# Patient Record
Sex: FEMALE | Race: WHITE | NOT HISPANIC OR LATINO | Employment: PART TIME | URBAN - METROPOLITAN AREA
[De-identification: names, ages, dates, MRNs, and addresses within clinical notes are randomized per-mention and may not be internally consistent; named-entity substitution may affect disease eponyms.]

---

## 2017-05-25 ENCOUNTER — HOSPITAL ENCOUNTER (OUTPATIENT)
Dept: MAMMOGRAPHY | Facility: HOSPITAL | Age: 57
Discharge: HOME | End: 2017-05-25
Attending: NURSE PRACTITIONER

## 2017-05-25 DIAGNOSIS — Z12.31 VISIT FOR SCREENING MAMMOGRAM: ICD-10-CM

## 2017-06-05 ENCOUNTER — OFFICE VISIT - HEALTHEAST (OUTPATIENT)
Dept: FAMILY MEDICINE | Facility: CLINIC | Age: 57
End: 2017-06-05

## 2017-06-05 DIAGNOSIS — Z13.29 SCREENING FOR THYROID DISORDER: ICD-10-CM

## 2017-06-05 DIAGNOSIS — Z00.00 ROUTINE GENERAL MEDICAL EXAMINATION AT A HEALTH CARE FACILITY: ICD-10-CM

## 2017-06-05 DIAGNOSIS — Z13.21 ENCOUNTER FOR VITAMIN DEFICIENCY SCREENING: ICD-10-CM

## 2017-06-05 DIAGNOSIS — Z13.0 SCREENING FOR DEFICIENCY ANEMIA: ICD-10-CM

## 2017-06-05 DIAGNOSIS — Z13.1 SCREENING FOR DIABETES MELLITUS: ICD-10-CM

## 2017-06-05 DIAGNOSIS — Z13.220 SCREENING FOR CHOLESTEROL LEVEL: ICD-10-CM

## 2017-06-05 LAB
CHOLEST SERPL-MCNC: 281 MG/DL
FASTING STATUS PATIENT QL REPORTED: YES
HDLC SERPL-MCNC: 60 MG/DL
LDLC SERPL CALC-MCNC: 190 MG/DL
TRIGL SERPL-MCNC: 154 MG/DL

## 2017-06-05 ASSESSMENT — MIFFLIN-ST. JEOR: SCORE: 1107.62

## 2017-06-06 ENCOUNTER — COMMUNICATION - HEALTHEAST (OUTPATIENT)
Dept: FAMILY MEDICINE | Facility: CLINIC | Age: 57
End: 2017-06-06

## 2017-06-06 DIAGNOSIS — E78.5 HYPERLIPIDEMIA: ICD-10-CM

## 2017-07-17 ENCOUNTER — OFFICE VISIT - HEALTHEAST (OUTPATIENT)
Dept: FAMILY MEDICINE | Facility: CLINIC | Age: 57
End: 2017-07-17

## 2017-07-17 DIAGNOSIS — M75.81 ROTATOR CUFF TENDINITIS, RIGHT: ICD-10-CM

## 2017-08-03 ENCOUNTER — OFFICE VISIT - HEALTHEAST (OUTPATIENT)
Dept: PHYSICAL THERAPY | Facility: REHABILITATION | Age: 57
End: 2017-08-03

## 2017-08-03 DIAGNOSIS — M25.611 DECREASED ROM OF RIGHT SHOULDER: ICD-10-CM

## 2017-08-03 DIAGNOSIS — M25.511 ACUTE SHOULDER PAIN DUE TO TRAUMA, RIGHT: ICD-10-CM

## 2017-08-03 DIAGNOSIS — G89.11 ACUTE SHOULDER PAIN DUE TO TRAUMA, RIGHT: ICD-10-CM

## 2017-08-03 DIAGNOSIS — M62.81 MUSCLE WEAKNESS (GENERALIZED): ICD-10-CM

## 2017-08-09 ENCOUNTER — OFFICE VISIT - HEALTHEAST (OUTPATIENT)
Dept: PHYSICAL THERAPY | Facility: REHABILITATION | Age: 57
End: 2017-08-09

## 2017-08-09 DIAGNOSIS — M25.511 ACUTE SHOULDER PAIN DUE TO TRAUMA, RIGHT: ICD-10-CM

## 2017-08-09 DIAGNOSIS — M25.611 DECREASED ROM OF RIGHT SHOULDER: ICD-10-CM

## 2017-08-09 DIAGNOSIS — M62.81 MUSCLE WEAKNESS (GENERALIZED): ICD-10-CM

## 2017-08-09 DIAGNOSIS — G89.11 ACUTE SHOULDER PAIN DUE TO TRAUMA, RIGHT: ICD-10-CM

## 2017-08-18 ENCOUNTER — OFFICE VISIT - HEALTHEAST (OUTPATIENT)
Dept: PHYSICAL THERAPY | Facility: REHABILITATION | Age: 57
End: 2017-08-18

## 2017-08-18 DIAGNOSIS — G89.11 ACUTE SHOULDER PAIN DUE TO TRAUMA, RIGHT: ICD-10-CM

## 2017-08-18 DIAGNOSIS — M62.81 MUSCLE WEAKNESS (GENERALIZED): ICD-10-CM

## 2017-08-18 DIAGNOSIS — M25.511 ACUTE SHOULDER PAIN DUE TO TRAUMA, RIGHT: ICD-10-CM

## 2017-08-18 DIAGNOSIS — M25.611 DECREASED ROM OF RIGHT SHOULDER: ICD-10-CM

## 2017-08-24 ENCOUNTER — OFFICE VISIT - HEALTHEAST (OUTPATIENT)
Dept: PHYSICAL THERAPY | Facility: REHABILITATION | Age: 57
End: 2017-08-24

## 2017-08-24 DIAGNOSIS — M25.511 ACUTE SHOULDER PAIN DUE TO TRAUMA, RIGHT: ICD-10-CM

## 2017-08-24 DIAGNOSIS — M25.611 DECREASED ROM OF RIGHT SHOULDER: ICD-10-CM

## 2017-08-24 DIAGNOSIS — G89.11 ACUTE SHOULDER PAIN DUE TO TRAUMA, RIGHT: ICD-10-CM

## 2017-08-24 DIAGNOSIS — M62.81 MUSCLE WEAKNESS (GENERALIZED): ICD-10-CM

## 2017-08-28 ENCOUNTER — HOSPITAL ENCOUNTER (OUTPATIENT)
Dept: NUTRITION | Facility: HOSPITAL | Age: 57
Discharge: HOME | End: 2017-08-28
Attending: NURSE PRACTITIONER

## 2017-08-28 DIAGNOSIS — E78.5 HYPERLIPIDEMIA, UNSPECIFIED HYPERLIPIDEMIA TYPE: ICD-10-CM

## 2017-09-08 ENCOUNTER — OFFICE VISIT - HEALTHEAST (OUTPATIENT)
Dept: PHYSICAL THERAPY | Facility: REHABILITATION | Age: 57
End: 2017-09-08

## 2017-09-08 DIAGNOSIS — M62.81 MUSCLE WEAKNESS (GENERALIZED): ICD-10-CM

## 2017-09-08 DIAGNOSIS — M25.511 ACUTE SHOULDER PAIN DUE TO TRAUMA, RIGHT: ICD-10-CM

## 2017-09-08 DIAGNOSIS — G89.11 ACUTE SHOULDER PAIN DUE TO TRAUMA, RIGHT: ICD-10-CM

## 2017-09-08 DIAGNOSIS — M25.611 DECREASED ROM OF RIGHT SHOULDER: ICD-10-CM

## 2018-01-30 ENCOUNTER — COMMUNICATION - HEALTHEAST (OUTPATIENT)
Dept: FAMILY MEDICINE | Facility: CLINIC | Age: 58
End: 2018-01-30

## 2018-03-05 ENCOUNTER — COMMUNICATION - HEALTHEAST (OUTPATIENT)
Dept: FAMILY MEDICINE | Facility: CLINIC | Age: 58
End: 2018-03-05

## 2018-06-27 ENCOUNTER — OFFICE VISIT - HEALTHEAST (OUTPATIENT)
Dept: FAMILY MEDICINE | Facility: CLINIC | Age: 58
End: 2018-06-27

## 2018-06-27 DIAGNOSIS — Z00.00 ROUTINE GENERAL MEDICAL EXAMINATION AT A HEALTH CARE FACILITY: ICD-10-CM

## 2018-06-27 DIAGNOSIS — Z12.31 ENCOUNTER FOR SCREENING MAMMOGRAM FOR BREAST CANCER: ICD-10-CM

## 2018-06-27 DIAGNOSIS — N95.1 MENOPAUSAL SYNDROME (HOT FLASHES): ICD-10-CM

## 2018-06-27 DIAGNOSIS — Z13.220 SCREENING FOR CHOLESTEROL LEVEL: ICD-10-CM

## 2018-06-27 DIAGNOSIS — Z13.1 SCREENING FOR DIABETES MELLITUS: ICD-10-CM

## 2018-06-27 DIAGNOSIS — Z13.21 ENCOUNTER FOR VITAMIN DEFICIENCY SCREENING: ICD-10-CM

## 2018-06-27 LAB
CHOLEST SERPL-MCNC: 246 MG/DL
FASTING STATUS PATIENT QL REPORTED: YES
FASTING STATUS PATIENT QL REPORTED: YES
FSH SERPL-ACNC: 72.2 MIU/ML
GLUCOSE BLD-MCNC: 85 MG/DL (ref 70–125)
HDLC SERPL-MCNC: 69 MG/DL
LDLC SERPL CALC-MCNC: 161 MG/DL
TRIGL SERPL-MCNC: 80 MG/DL

## 2018-06-27 ASSESSMENT — MIFFLIN-ST. JEOR: SCORE: 1076.43

## 2018-06-28 LAB
25(OH)D3 SERPL-MCNC: 34 NG/ML (ref 30–80)
25(OH)D3 SERPL-MCNC: 34 NG/ML (ref 30–80)

## 2018-07-26 ENCOUNTER — HOSPITAL ENCOUNTER (OUTPATIENT)
Dept: MAMMOGRAPHY | Facility: CLINIC | Age: 58
Discharge: HOME | End: 2018-07-26
Attending: NURSE PRACTITIONER

## 2018-07-26 DIAGNOSIS — Z12.31 ENCOUNTER FOR SCREENING MAMMOGRAM FOR BREAST CANCER: ICD-10-CM

## 2018-08-15 ENCOUNTER — RECORDS - HEALTHEAST (OUTPATIENT)
Dept: ADMINISTRATIVE | Facility: OTHER | Age: 58
End: 2018-08-15

## 2018-08-31 ENCOUNTER — COMMUNICATION - HEALTHEAST (OUTPATIENT)
Dept: SCHEDULING | Facility: CLINIC | Age: 58
End: 2018-08-31

## 2018-09-11 ENCOUNTER — AMBULATORY - HEALTHEAST (OUTPATIENT)
Dept: NURSING | Facility: CLINIC | Age: 58
End: 2018-09-11

## 2018-09-11 DIAGNOSIS — Z00.00 ROUTINE GENERAL MEDICAL EXAMINATION AT A HEALTH CARE FACILITY: ICD-10-CM

## 2018-11-14 ENCOUNTER — OFFICE VISIT - HEALTHEAST (OUTPATIENT)
Dept: FAMILY MEDICINE | Facility: CLINIC | Age: 58
End: 2018-11-14

## 2018-11-14 DIAGNOSIS — R42 LIGHTHEADEDNESS: ICD-10-CM

## 2018-11-14 LAB
ALBUMIN SERPL-MCNC: 3.7 G/DL (ref 3.5–5)
ALP SERPL-CCNC: 57 U/L (ref 45–120)
ALT SERPL W P-5'-P-CCNC: 15 U/L (ref 0–45)
ANION GAP SERPL CALCULATED.3IONS-SCNC: 9 MMOL/L (ref 5–18)
AST SERPL W P-5'-P-CCNC: 21 U/L (ref 0–40)
BILIRUB SERPL-MCNC: 0.5 MG/DL (ref 0–1)
BUN SERPL-MCNC: 14 MG/DL (ref 8–22)
CALCIUM SERPL-MCNC: 9.1 MG/DL (ref 8.5–10.5)
CHLORIDE BLD-SCNC: 105 MMOL/L (ref 98–107)
CO2 SERPL-SCNC: 28 MMOL/L (ref 22–31)
CREAT SERPL-MCNC: 0.68 MG/DL (ref 0.6–1.1)
ERYTHROCYTE [DISTWIDTH] IN BLOOD BY AUTOMATED COUNT: 11.8 % (ref 11–14.5)
FERRITIN SERPL-MCNC: 66 NG/ML (ref 10–130)
GFR SERPL CREATININE-BSD FRML MDRD: >60 ML/MIN/1.73M2
GLUCOSE BLD-MCNC: 84 MG/DL (ref 70–125)
HCT VFR BLD AUTO: 40.2 % (ref 35–47)
HGB BLD-MCNC: 13.3 G/DL (ref 12–16)
MCH RBC QN AUTO: 29.9 PG (ref 27–34)
MCHC RBC AUTO-ENTMCNC: 33 G/DL (ref 32–36)
MCV RBC AUTO: 91 FL (ref 80–100)
PLATELET # BLD AUTO: 232 THOU/UL (ref 140–440)
PMV BLD AUTO: 7.4 FL (ref 7–10)
POTASSIUM BLD-SCNC: 4.3 MMOL/L (ref 3.5–5)
PROT SERPL-MCNC: 6.6 G/DL (ref 6–8)
RBC # BLD AUTO: 4.43 MILL/UL (ref 3.8–5.4)
SODIUM SERPL-SCNC: 142 MMOL/L (ref 136–145)
TSH SERPL DL<=0.005 MIU/L-ACNC: 2.8 UIU/ML (ref 0.3–5)
VIT B12 SERPL-MCNC: 1008 PG/ML (ref 213–816)
WBC: 4.7 THOU/UL (ref 4–11)

## 2018-12-04 ENCOUNTER — AMBULATORY - HEALTHEAST (OUTPATIENT)
Dept: NURSING | Facility: CLINIC | Age: 58
End: 2018-12-04

## 2018-12-04 DIAGNOSIS — Z00.00 ROUTINE GENERAL MEDICAL EXAMINATION AT A HEALTH CARE FACILITY: ICD-10-CM

## 2019-02-06 ENCOUNTER — VIRTUAL VISIT (OUTPATIENT)
Dept: FAMILY MEDICINE | Facility: OTHER | Age: 59
End: 2019-02-06

## 2019-02-07 NOTE — PROGRESS NOTES
"Date:   Clinician: Luciano Huddleston  Clinician NPI: [id number]  Patient: Hali Hebert  Patient : 1960  Patient Address: [address] [place] [address] [address], [place] [address]  Patient Phone: [phone]  Visit Protocol: Ear pain  Patient Summary:  Hali is a 58 year old ( : 1960 ) female who initiated a Visit for swimmer's ear (ear pain). When asked the question \"Please sign me up to receive news, health information and promotions from [facility].\", Hali responded \"No\".    Hali reports that her ear pain started 2-4 days ago. The ear pain is located inside the right ear.   In addition to the ear pain, Hali is experiencing a feeling of fullness in the ear(s).   Symptom Details   Pain: Hali is experiencing moderate pain (4-6 on a 10 point pain scale). It gets worse when she eats or chews. It does not get worse when she gently pulls on the earlobe(s).    Hali denies tenderness, redness, and itchiness in the ear(s). She also denies the possibility of a foreign object in the ear(s), ever having ear tubes, recent injuries near the ear(s), having fluid draining from the ear(s), and feeling feverish. Hali denies swimming and flying within the past week.    Weight: 125 lbs   She does not smoke or use smokeless tobacco.   Additional health information pertinent to this Visit as reported by the patient (free text): I had a respiratory virus 7 days ago with coughing and fever.  My cough is better but my ear is sore and plugged.   MEDICATIONS: No current medications, ALLERGIES: NKDA  Clinician Response:  Dear Hali,  Based on the information you provided, you have otalgia, otherwise known as ear pain.   Unless you are allergic to the over-the-counter medication(s) below, I recommend using:   Acetaminophen (Tylenol or store brand) oral tablet. Please follow the instructions on the package. This is an over-the-counter medication that does not require a prescription.   Mild ear pain or pressure is common when you have an " upper respiratory infection. The pain is caused by fluid and inflammation in your sinus passages. If your ear pain persists more than 3 days or if you have new or worsening symptoms, please be seen in a clinic to get your ears examined.   Diagnosis: Otalgia (ear pain)  Diagnosis ICD: H92.09  Additional Clinician Notes: If you do not have high blood pressure try a decongestant such as sudafed.   Flonase/fluticasone nasal spray a day to help with inflammation in the sinuses that may be blocking the drainage of fluid from that ear

## 2019-07-17 ENCOUNTER — RECORDS - HEALTHEAST (OUTPATIENT)
Dept: ADMINISTRATIVE | Facility: OTHER | Age: 59
End: 2019-07-17

## 2019-07-18 ENCOUNTER — OFFICE VISIT - HEALTHEAST (OUTPATIENT)
Dept: FAMILY MEDICINE | Facility: CLINIC | Age: 59
End: 2019-07-18

## 2019-07-18 DIAGNOSIS — Z13.1 SCREENING FOR DIABETES MELLITUS: ICD-10-CM

## 2019-07-18 DIAGNOSIS — Z13.21 ENCOUNTER FOR VITAMIN DEFICIENCY SCREENING: ICD-10-CM

## 2019-07-18 DIAGNOSIS — Z00.00 ROUTINE GENERAL MEDICAL EXAMINATION AT A HEALTH CARE FACILITY: ICD-10-CM

## 2019-07-18 DIAGNOSIS — H69.91 DYSFUNCTION OF RIGHT EUSTACHIAN TUBE: ICD-10-CM

## 2019-07-18 DIAGNOSIS — N63.20 LEFT BREAST MASS: ICD-10-CM

## 2019-07-18 DIAGNOSIS — E78.2 MIXED HYPERLIPIDEMIA: ICD-10-CM

## 2019-07-18 LAB
CHOLEST SERPL-MCNC: 262 MG/DL
FASTING STATUS PATIENT QL REPORTED: YES
FASTING STATUS PATIENT QL REPORTED: YES
GLUCOSE BLD-MCNC: 78 MG/DL (ref 70–99)
HDLC SERPL-MCNC: 71 MG/DL
LDLC SERPL CALC-MCNC: 172 MG/DL
TRIGL SERPL-MCNC: 93 MG/DL

## 2019-07-18 ASSESSMENT — MIFFLIN-ST. JEOR: SCORE: 1093.44

## 2019-07-19 ENCOUNTER — HOSPITAL ENCOUNTER (OUTPATIENT)
Dept: ULTRASOUND IMAGING | Facility: CLINIC | Age: 59
Discharge: HOME | End: 2019-07-19
Attending: NURSE PRACTITIONER

## 2019-07-19 ENCOUNTER — HOSPITAL ENCOUNTER (OUTPATIENT)
Dept: MAMMOGRAPHY | Facility: CLINIC | Age: 59
Discharge: HOME | End: 2019-07-19
Attending: NURSE PRACTITIONER

## 2019-07-19 DIAGNOSIS — N63.20 LEFT BREAST MASS: ICD-10-CM

## 2019-07-19 LAB
25(OH)D3 SERPL-MCNC: 35.4 NG/ML (ref 30–80)
25(OH)D3 SERPL-MCNC: 35.4 NG/ML (ref 30–80)

## 2019-07-31 ENCOUNTER — RECORDS - HEALTHEAST (OUTPATIENT)
Dept: ADMINISTRATIVE | Facility: OTHER | Age: 59
End: 2019-07-31

## 2021-04-15 ENCOUNTER — OFFICE VISIT - HEALTHEAST (OUTPATIENT)
Dept: FAMILY MEDICINE | Facility: CLINIC | Age: 61
End: 2021-04-15

## 2021-04-15 DIAGNOSIS — Z13.0 SCREENING FOR DEFICIENCY ANEMIA: ICD-10-CM

## 2021-04-15 DIAGNOSIS — Z13.220 SCREENING FOR CHOLESTEROL LEVEL: ICD-10-CM

## 2021-04-15 DIAGNOSIS — Z13.21 ENCOUNTER FOR VITAMIN DEFICIENCY SCREENING: ICD-10-CM

## 2021-04-15 DIAGNOSIS — Z78.0 POST-MENOPAUSAL: ICD-10-CM

## 2021-04-15 DIAGNOSIS — Z00.00 ROUTINE GENERAL MEDICAL EXAMINATION AT A HEALTH CARE FACILITY: ICD-10-CM

## 2021-04-15 DIAGNOSIS — Z12.31 VISIT FOR SCREENING MAMMOGRAM: ICD-10-CM

## 2021-04-15 DIAGNOSIS — Z13.1 SCREENING FOR DIABETES MELLITUS: ICD-10-CM

## 2021-04-15 DIAGNOSIS — E78.2 MIXED HYPERLIPIDEMIA: ICD-10-CM

## 2021-04-15 LAB
ALBUMIN SERPL-MCNC: 4.5 G/DL (ref 3.5–5)
ALP SERPL-CCNC: 59 U/L (ref 45–120)
ALT SERPL W P-5'-P-CCNC: 16 U/L (ref 0–45)
ANION GAP SERPL CALCULATED.3IONS-SCNC: 10 MMOL/L (ref 5–18)
AST SERPL W P-5'-P-CCNC: 24 U/L (ref 0–40)
BILIRUB SERPL-MCNC: 0.7 MG/DL (ref 0–1)
BUN SERPL-MCNC: 15 MG/DL (ref 8–22)
CALCIUM SERPL-MCNC: 9.4 MG/DL (ref 8.5–10.5)
CHLORIDE BLD-SCNC: 104 MMOL/L (ref 98–107)
CHOLEST SERPL-MCNC: 262 MG/DL
CO2 SERPL-SCNC: 28 MMOL/L (ref 22–31)
CREAT SERPL-MCNC: 0.79 MG/DL (ref 0.6–1.1)
FASTING STATUS PATIENT QL REPORTED: YES
GFR SERPL CREATININE-BSD FRML MDRD: >60 ML/MIN/1.73M2
GLUCOSE BLD-MCNC: 90 MG/DL (ref 70–125)
HDLC SERPL-MCNC: 73 MG/DL
HGB BLD-MCNC: 14 G/DL (ref 12–16)
LDLC SERPL CALC-MCNC: 163 MG/DL
POTASSIUM BLD-SCNC: 4.3 MMOL/L (ref 3.5–5)
PROT SERPL-MCNC: 7.2 G/DL (ref 6–8)
SODIUM SERPL-SCNC: 142 MMOL/L (ref 136–145)
TRIGL SERPL-MCNC: 131 MG/DL

## 2021-04-15 RX ORDER — IMIQUIMOD 12.5 MG/.25G
CREAM TOPICAL
Status: SHIPPED | COMMUNITY
Start: 2021-01-07 | End: 2022-04-27

## 2021-04-15 ASSESSMENT — MIFFLIN-ST. JEOR: SCORE: 1111.55

## 2021-04-16 LAB
25(OH)D3 SERPL-MCNC: 44.7 NG/ML (ref 30–80)
25(OH)D3 SERPL-MCNC: 44.7 NG/ML (ref 30–80)

## 2021-04-23 ENCOUNTER — RECORDS - HEALTHEAST (OUTPATIENT)
Dept: MAMMOGRAPHY | Facility: CLINIC | Age: 61
End: 2021-04-23

## 2021-04-23 ENCOUNTER — RECORDS - HEALTHEAST (OUTPATIENT)
Dept: BONE DENSITY | Facility: CLINIC | Age: 61
End: 2021-04-23

## 2021-04-23 ENCOUNTER — RECORDS - HEALTHEAST (OUTPATIENT)
Dept: ADMINISTRATIVE | Facility: OTHER | Age: 61
End: 2021-04-23

## 2021-04-23 DIAGNOSIS — Z78.0 ASYMPTOMATIC MENOPAUSAL STATE: ICD-10-CM

## 2021-04-23 DIAGNOSIS — Z12.31 ENCOUNTER FOR SCREENING MAMMOGRAM FOR MALIGNANT NEOPLASM OF BREAST: ICD-10-CM

## 2021-05-28 ENCOUNTER — RECORDS - HEALTHEAST (OUTPATIENT)
Dept: ADMINISTRATIVE | Facility: OTHER | Age: 61
End: 2021-05-28

## 2021-05-30 VITALS — HEIGHT: 63 IN | WEIGHT: 124 LBS | BODY MASS INDEX: 21.97 KG/M2

## 2021-05-30 NOTE — PATIENT INSTRUCTIONS - HE
Recommendations from today's visit                                                       1. For the ear fulness, I recommend restarting the nasal corticosteroid - Nasacort or Flonase.      2. We will schedule a diagnostic mammogram and ultrasound today and follow up with results    Next appointment: one year, annual physical     To reschedule your appointment, please call the clinic directly at [phone].   It was a pleasure seeing you today! I look forward to seeing you again.

## 2021-05-30 NOTE — PROGRESS NOTES
FEMALE PREVENTIVE EXAM    Assessment & Plan   1. Routine general medical examination at a health care facility  Fasting labs, up to date on colon cancer screening and immunizations    2. Left breast mass  Visible change in contour of the left breast with soft, ill-defined mass at 6-7 o'clock.  Diagnostic mammogram and US to assess  - Mammo Diagnostic Bilateral; Future  - US Breast Limited (Focal) Left; Future    3. Mixed hyperlipidemia  Recheck labs, encouraged continued exercise and healthy diet.  She feels the plant stanol supplement has helped significantly  - Lipid Cascade    4. Dysfunction of right eustachian tube  Recommended restarting ICS. Discussed likely multifactorial and TMJ may be playing a role. Continue with     5. Screening for diabetes mellitus  - Glucose    6. Encounter for vitamin deficiency screening  - Vitamin D, Total (25-Hydroxy)    Recommended adequate calcium intake/osteoporosis prevention, Discussed breast cancer screening guidelines, Discussed colon cancer screening at age 50, 45 if -American and Discussed diet, including moderation of portions sizes, avoiding eating out and fast food and increase in fruits and vegetables    Charo Lorenzo, CNP    Subjective:   Chief Complaint:  Annual Exam (fasting)    HPI:  Hali Hebert is a 58 y.o. female who presents for routine physical exam.    Ears:  She was seen in November for sensation of intermittent lightheadedness.  Labs normal. She did have bilateral effusions on exam so thought to be related to inner ears.  Recommended trial of nasal corticosteroid. She states this worked quite well and symptoms stopped completely after two weeks.  She eventually tapered off of the medication after a month.  Now experiencing fulness and pressure in right ear. Does clench her teeth and wear a nightguard so she feels some of this may be radiating from the jaw. Has noted seasonal component, worse in spring and fall.  Nasal congestion during  "those times as well.     OB/Gyn History:  Menstrual history: s/p hysterectomy  Date of previous pap: s/p hysterectomy    Preventive Health:  Reviewed and recommended screening and treatment recommendations:  Mammography: 2018, due.  Annual. FMH of breast cancer in mother at age 60  Colonoscopy: due 2023  DEXA: not indicated  Immunizations: up to date    Health Habits:    Exercise: yes, regularly.  Calcium intake/Osteoporosis prevention: three servings of dairy daily    PMH:   There is no problem list on file for this patient.      Past Medical History:   Diagnosis Date     Breast cyst      H/O hemorrhoidectomy 2014     S/P hysterectomy 2009       Current Medications: Reviewed   Current Outpatient Medications on File Prior to Visit   Medication Sig Dispense Refill     multivitamin therapeutic (THERAGRAN) tablet Take 1 tablet by mouth daily.       plant stanol sowmya (CHOLEST OFF) 450 mg Tab Take by mouth.       omega 3-dha-epa-fish oil (FISH OIL) 100-160-1,000 mg cap Take by mouth.       No current facility-administered medications on file prior to visit.        Allergies:  Reviewed  has No Known Allergies.    Social History:  Social History     Occupational History     Not on file   Tobacco Use     Smoking status: Never Smoker     Smokeless tobacco: Never Used   Substance and Sexual Activity     Alcohol use: Yes     Alcohol/week: 1.8 oz     Types: 3 Glasses of wine per week     Drug use: Not on file     Sexual activity: Not on file       Family History:   Family History   Problem Relation Age of Onset     Breast cancer Mother 60     Cancer Daughter 23         Review of Systems:  Complete head to toe review of systems is otherwise negative except as above.    Objective:    /58 (Patient Site: Right Arm, Patient Position: Sitting, Cuff Size: Adult Regular)   Pulse 64   Ht 5' 2.5\" (1.588 m)   Wt 121 lb 12 oz (55.2 kg)   BMI 21.91 kg/m      GENERAL: Alert, well-appearing female .   PSYCH: Pleasant mood, affect " appropriate.   SKIN: No atypical lesions  EYES: Conjunctiva pink, sclera white, no exudates. JENNIFER.  EOMs intact.   EARS: Right TM with serous effusion. No bulging or erythema.  Left TM pearly grey, no bulging, redness, retraction.   MOUTH: Pharynx moist, pink without exudate. No tonsillar enlargement  NECK: No lymphadenopathy. Thyroid borders smooth without enlargement, nodules.   CV: Regular rate and rhythm without murmurs, rubs or gallops.  RESP: Lung sounds clear, symmetric excursion.   ABDOMEN: BS+. Abdomen soft.  No organomegaly  BREASTS: There is a visible asymmetry of the last breast at 6-7 o'clock.  On palpation there is an area of soft fullness, ill-defined.  Mobile.  Nontender to palpation.  Areolas and nipples symmetric without discharge. Axillary and epitrochlear lymph nodes nonpalpable.   PV :  No edema

## 2021-05-31 VITALS — BODY MASS INDEX: 22.32 KG/M2 | WEIGHT: 125 LBS

## 2021-06-01 VITALS — WEIGHT: 118 LBS | HEIGHT: 63 IN | BODY MASS INDEX: 20.91 KG/M2

## 2021-06-02 VITALS — BODY MASS INDEX: 23.44 KG/M2 | WEIGHT: 130.25 LBS

## 2021-06-03 VITALS — WEIGHT: 121.75 LBS | BODY MASS INDEX: 21.57 KG/M2 | HEIGHT: 63 IN

## 2021-06-05 VITALS
DIASTOLIC BLOOD PRESSURE: 60 MMHG | SYSTOLIC BLOOD PRESSURE: 104 MMHG | WEIGHT: 125.4 LBS | TEMPERATURE: 97 F | OXYGEN SATURATION: 96 % | HEART RATE: 69 BPM | BODY MASS INDEX: 22.22 KG/M2 | HEIGHT: 63 IN

## 2021-06-11 NOTE — PROGRESS NOTES
Assessment & Plan   1. Rotator cuff tendinitis, right:  No signs of impingement or weakness on exam, concern for tear is low though still possible considering mechanism of injury.  Will treat with physical therapy, if no improvement would consider MRI  - Ambulatory referral to Physical Therapy    Charo Lorenzo CNP    Subjective   Chief Complaint:  Shoulder Injury (R shoulder injury about 5 weeks ago, still having pain after fall, possible rotator cuff injury)    HPI:   Hali Hebert is a 56 y.o. female who presents for evaluation of right shoulder injury.      Five weeks ago she slipped on a walk and fell on her outstretched hand, right side.  She felt pain shortly after in the shoulder and has had little improvement since that time.   Pain is mostly with overhead reaching and is also uncomfortable when lying on the right side.  No pain at rest.  She has not experienced any weakness.   Has continued to be somewhat active, kayaking as she enjoys.  Pain is described as not severe but bothersome, mostly at night. No previous injury to shoulder.      PMH:   There is no problem list on file for this patient.      Past Medical History:   Diagnosis Date     Breast cyst      H/O hemorrhoidectomy 2014     S/P hysterectomy 2009       Current Medications:   Current Outpatient Prescriptions on File Prior to Visit   Medication Sig Dispense Refill     multivitamin therapeutic (THERAGRAN) tablet Take 1 tablet by mouth daily.       omega 3-dha-epa-fish oil (FISH OIL) 100-160-1,000 mg cap Take by mouth.       No current facility-administered medications on file prior to visit.        Allergies:  has No Known Allergies.    SH/FH:  Social History and Family History reviewed and updated.   Tobacco Status:  She  reports that she has never smoked. She does not have any smokeless tobacco history on file.    Review of Systems:  A complete head to toe ROS is negative unless otherwise noted in HPI    Objective     Vitals:    07/17/17 1103    BP: 108/52   Patient Site: Left Arm   Patient Position: Sitting   Cuff Size: Adult Regular   Pulse: 68   Weight: 125 lb (56.7 kg)     Wt Readings from Last 3 Encounters:   07/17/17 125 lb (56.7 kg)   06/05/17 124 lb (56.2 kg)   01/06/16 121 lb (54.9 kg)       Physical Exam:  GENERAL: Alert, well-appearing female  SKIN: No erythema, edema  MSK: Shoulders in alignment.  No TTP at SC, AC joints, biceps insertion.  Full ROM, pain with internal rotation.  Negative impingement testing.  UE strength 4/5 bilaterally.        Labs:    No results found for this or any previous visit (from the past 168 hour(s)).

## 2021-06-11 NOTE — PROGRESS NOTES
FEMALE PREVENTIVE EXAM    Subjective:   Chief Complaint:  Annual Exam (fasting; no pap)    HPI:  Hali Hebert is a 56 y.o. female who presents for routine physical exam.   PMH negative for chronic concern.      Cholesterol moderately elevated last year. She states she has been working on increasing exercise, runs several times a week, as well as healthy diet.  FMH in father and mother despite them having similar healthy lifestyles and low BMIs.      She has noted her eyes are more sensitive recently.  Watering and sensitive to sunlight.  No changes in vision.  Eye exam one year ago, wears glasses    OB/Gyn History:  Menstrual history: s/p hysterectomy 2009  Date of previous pap: 2009  Current Contraceptive method: s/p hysterectomy    Preventive Health:  Reviewed and recommended screening and treatment recommendations:  Mammography: 05/2017.  FMH of breast cancer in mom in 60s.    Colonoscopy: 2013  Immunizations: up to date    Health Habits:    Exercise: running 3-4x/week  Calcium intake/Osteoporosis prevention: daily multivitamin, no D  Guns: NO  Sun Screen: YES    PMH:   There is no problem list on file for this patient.      Past Medical History:   Diagnosis Date     Breast cyst      H/O hemorrhoidectomy 2014     S/P hysterectomy 2009       Current Medications: Reviewed   No current outpatient prescriptions on file prior to visit.     No current facility-administered medications on file prior to visit.        Allergies:  Reviewed  has No Known Allergies.    Social History:  Social History     Occupational History     Not on file.     Social History Main Topics     Smoking status: Never Smoker     Smokeless tobacco: Not on file     Alcohol use 1.8 oz/week     3 Glasses of wine per week     Drug use: Not on file     Sexual activity: Not on file       Family History:   Family History   Problem Relation Age of Onset     Breast cancer Mother      Cancer Daughter 23         Review of Systems:  Complete head to toe  "review of systems is otherwise negative except as above.    Objective:    /60 (Patient Site: Left Arm, Patient Position: Sitting, Cuff Size: Adult Regular)  Pulse 64  Ht 5' 2.75\" (1.594 m)  Wt 124 lb (56.2 kg)  BMI 22.14 kg/m2    GENERAL: Alert, well appearing female  PSYCH: Pleasant mood, affect appropriate.    HEAD: Normocephalic, atraumatic  EYES: Conjunctiva pink, sclera white, no exudates. JENNIFER.  EOMs intact. Corneal light reflex bilaterally, red reflex present. Undilated fundoscopic exam normal  EARS: TMs pearly grey, no bulging, redness, retraction. Hearing grossly normal.   NOSE: Nares patent, no discharge.   MOUTH: Pharynx moist, pink without exudate. No tonsillar enlargement  NECK: No lymphadenopathy. Thyroid borders smooth without enlargement, nodules.   CV: Regular rate and rhythm without murmurs, rubs or gallops.  RESP: Lung sounds clear, symmetric excursion.   ABDOMEN: BS+. Abdomen soft, nontender to palpation without guarding. No organomegaly, masses or hernias  BREASTS: Breasts symmetric, no dimpling, masses or skin discolorations seen. Areolas and nipples symmetric without discharge. On palpation, breast tissue supple and nontender. No masses or nodules. Axillary and epitrochlear lymph nodes nonpalpable.   PV : LEs warm, pink, symmetric hair distribution. No edema, tenderness or varicosities.       Assessment & Plan   1. Routine general medical examination at a health care facility:  Fasting labs, not due for pap.      2. Screening for cholesterol level  - Lipid Cascade    3. Screening for diabetes mellitus  - Glucose    4. Screening for deficiency anemia  - HM2(CBC w/o Differential)    5. Screening for thyroid disorder  - Thyroid [place]    6. Encounter for vitamin deficiency screening  - Vitamin D, Total (25-Hydroxy)    Recommend repeat pap smear if normal every five years  Alcohol use, safety and moderation discussed.  Recommended adequate calcium intake/osteoporosis " prevention.  Discussed colon cancer screening at age 50, 45 if -American.  Diet discuss, including moderation of portions sizes, avoiding eating out and fast food and increase in fruits and vegetables.  Discussed safe sex practices.  Discussed & recommended seat belt (& motorcycle helmet) use.      Charo Lorenzo, CNP

## 2021-06-12 NOTE — PROGRESS NOTES
[facility] Daily Progress     Patient Name: Hali Hebert  PRECAUTIONS/RESTRICTIONS: none  Date: 2017  Visit #:  PTA visit #:  1  Referral Diagnosis:  Right rotator cuff tendonitis  Referring provider: Charo Lorenzo CNP  Visit Diagnosis:     ICD-10-CM    1. Acute shoulder pain due to trauma, right M25.511     G89.11    2. Decreased ROM of right shoulder M25.611    3. Muscle weakness (generalized) M62.81          Assessment:     Complaint with HEP  Progress made with decreasing pain and increased function when shampooing, reaching forward>resting hands in head with hands behind head is tolerated for only 15 seconds before increasing pain.  Pt would benefit with continuing PT visits.  Continues to display painful arc with shoulder abduction and end range pain and slight limitation with shoulder ER/IR.  SPADI Outcome Measure has demonstrated a statistically significant change.  Meeting expectations    Goal Status: Ongoing  Pt. will demonstrate/verbalize independence in self-management of condition in : Progressing toward  Pt. will be independent with home exercise program in : Progressing toward  Patient will decrease : Met (Current score 7.69%, decreased from 27.69%)  by ___ points: 10  Pt will: Abrupt reaching overhead into cupboards with less pain/greater ease in 6 weeks-progress toward goal  Pt will: Be able to shampoo hair with less pain/greater ease in 6 weeks-progress toward  Pt will: be able to place hands behind head, resting head in hands with less pain/greater ease in 6 weeks-partially met    Plan / Patient Education:     Continue 1x in 2 weeks.  If she is doing well and does not feel she needs the appointment she will cancel on 2017.  Continue POC  Continue US as needed.   Add MFR/joint mobs to AP shoulder.  Add corner pectoral stretch or supine ER or thoracic towel stretch.    Subjective:     Pain Ratin/10 (decreased from 2/10 at initial visit) intermittent right shoulder pain  "but much better.  Less frequent sharp pain when reaching forward.  Still sore but is working shoulder more  Computer positioning helpful  Compliant with HEP but feels like it is a lot of exercise.  Would like to consolidate.    Response to PT/benefits: reaching forward and shampooing with less pain    Continued difficulties: placing hands behind head    SPADI Outcome Measure:  Initial 27.69%  Current 7.69%    Objective:   Shoulder/Elbow ROM            Date: 8/3/2017  *=pain  8/18/2017 8/24/2017    Shoulder and Elbow ROM ( ) AROM in degrees AROM in degrees AROM in degrees    Right Left Right Left Right Left   Shoulder Flexion (0-180 ) 155 end range * and more with sustained elevation 155 155    155      Shoulder Abduction (0-180 ) 150 painful arc  To end range * 153 150 painful arc  To end range *    160 with painful arc      Shoulder Extension (0-60 ) 50 end range * 53 50   50      Shoulder ER (0-90 ) 80 end range * 85 70 end range pain    80 with end range pain      Shoulder IR (0-70 ) T10 end range * T10 with scapular pop T10     T12 with end range pain     Shoulder IR/Ext               Elbow Flexion (150 )               Elbow Extension (0 )                 Shoulder/Elbow Strength Tested isometrically           Date: 8/3/2017  8/24/2017     Shoulder/Elbow Strength (/5)  Manual Muscle Test (MMT) MMT MMT MMT    Right Left Right Left Right Left   Shoulder Flexion      5 5        Supraspinatus 5-   5   5       Shoulder Abduction \"Feels weak\"   4 with pain          Shoulder Extension               Shoulder External Rotation 4+    5-         Shoulder Internal Rotation 4- with pain    4 with pain         Elbow Flexion Thumb up 5- with pain; palm up 5/5   5         Elbow Extension 5/5    5         Other:                 Palpation:  None but localizes pain in area of bicipital groove.    Today's Exercises:  OPT EXERCISES 8/24/2017   Exercise #6    Comment #6 Table top/counter push-up, 10x-H   Exercise #7 Shoulder flex with " "1#  to 90o, 10x-H   Comment #7 Shoulder scaption with 6 oz x10 with fatigue-H   Exercise #8 Wall rubs, 40\" CW, CCW up and down in painfree range-H   Comment #8 Self distraction arm forward/to side/to back, 20\" hold-H   Exercise #9 Right SL posterior capsule stretch, 10\" hold in painfree range-H     Tolerated treatment well. Distraction to joint  feels good.      Treatment Today  8/24/2017    TREATMENT MINUTES COMMENTS   Evaluation     Self-care/ Home management     Manual therapy  CFM reviewed   Neuromuscular Re-education     Therapeutic Activity     Therapeutic Exercises 20 HEP per flow sheet; formalized HEP   Gait training     Modality___seated right AP shoulder and anterolateral arm_______________  US 3MHZ 100% 0.9 wt/cm2   ROM Assessment 10          Total 30    Blank areas are intentional and mean the treatment did not include these items.       Celestina Gannon  8/24/2017    "

## 2021-06-12 NOTE — PROGRESS NOTES
[facility] Daily Progress     Patient Name: Hali Hebert  PRECAUTIONS/RESTRICTIONS: none  Date: 2017  Visit #:  PTA visit #:  1  Referral Diagnosis:  Right rotator cuff tendonitis  Referring provider: Charo Lorenzo CNP  Visit Diagnosis:     ICD-10-CM    1. Acute shoulder pain due to trauma, right M25.511     G89.11    2. Decreased ROM of right shoulder M25.611    3. Muscle weakness (generalized) M62.81          Assessment:     Complaint with HEP  Progress made with decreasing pain and increased function when shampooing, reaching forward and resting hands in head with hands behind head.  Quick reaching forward and overhead movements will still elicit sharp pain but overall reports 80-90% improvement in pain reduction and increased function.  Everyday activities are well tolerated.  Continues to display painful arc with shoulder abduction but is progressively less painful and end range pain and is more  Limitation that at initial visit with shoulder ER/IR.  SPADI Outcome Measure has demonstrated a statistically significant change.  Meeting expectations    Goal Status:   Pt. will demonstrate/verbalize independence in self-management of condition in : Met  Pt. will be independent with home exercise program in : Met  Patient will decrease : Met  by ___ points: 10  Pt will: Abrupt reaching overhead into cupboards with less pain/greater ease in 6 weeks-progress toward goal  Pt will: Be able to shampoo hair with less pain/greater ease in 6 weeks-met  Pt will: be able to place hands behind head, resting head in hands with less pain/greater ease in 6 weeks-met    Plan / Patient Education:     Patient will continue independently on HEP.  If no contact from patient in the next 30 days will DC on HEP.    Subjective:     Pain Ratin/10 generally shoulder pain is gone unless quickly reaching for something and then will experience sharp shooting pain.(decreased from 2/10 at initial visit).   She reports  "quickly reaching forward the past weekend and feels she is slightly more sore following that maneuver.  Overall 80-90% improvement.  Now primarily feels muscle soreness from using it more and it is affecting arm and forearm musculature.  Compliant with HEP daily.    Response to PT/benefits: reaching forward and shampooing and supporting head with hands, all with less pain    Continued difficulties: quick movements    SPADI Outcome Measure:  Initial 27.69%  Current 5.38%    Objective:   Shoulder/Elbow ROM            Date: 8/3/2017  *=pain  8/18/2017 8/24/2017    Shoulder and Elbow ROM ( ) AROM in degrees AROM in degrees AROM in degrees    Right Left Right Left Right Left   Shoulder Flexion (0-180 ) 155 end range * and more with sustained elevation 155 155    155      Shoulder Abduction (0-180 ) 150 painful arc  To end range * 153 150 painful arc  To end range *    160 with painful arc      Shoulder Extension (0-60 ) 50 end range * 53 50   50      Shoulder ER (0-90 ) 80 end range * 85 70 end range pain    80 with end range pain      Shoulder IR (0-70 ) T10 end range * T10 with scapular pop T10     T12 with end range pain     Shoulder IR/Ext               Elbow Flexion (150 )               Elbow Extension (0 )               AROM: Shoulder flex:  155, Ext 50, Abduction 160 with only mild pull/painful arc, IR T12 mild pain, ER 60 sore.  PROM:  Flex 155 with end range pain/ no pain with distraction, Abduction 160 with end range pain/less pain with distraction, ER 80 with end range pain/no better with distraction, IR 60o mild end range pain    Shoulder/Elbow Strength Tested isometrically           Date: 8/3/2017  8/24/2017 9/8/2017    Shoulder/Elbow Strength (/5)  Manual Muscle Test (MMT) MMT MMT MMT    Right Left Right Left Right Left   Shoulder Flexion      5 5   5     Supraspinatus 5-   5   5 5      Shoulder Abduction \"Feels weak\"   4 with pain     5     Shoulder Extension               Shoulder External Rotation 4+   " " 5-   5      Shoulder Internal Rotation 4- with pain    4 with pain   4 with pain      Elbow Flexion Thumb up 5- with pain; palm up 5/5   5    5     Elbow Extension 5/5    5   5     Other:                 Palpation:  Sensitive at subscapularis tendon.  Slightly asymmetrical in scapulothoracic eccentic lowering yet.    Complete HEP:  Exercises:  Exercise #1: Discussed use of CP after aggressive activity  Comment #1: Discussed use of HP 2-3x/day  Exercise #2: Discussed avoiding shoulder abduction at gym and use of heavy weights for bicep curls  Comment #2: Cerv/scap retraction with row/pull down with green exercise band, 10x5\" each-H  Exercise #3: Wand supine flex/seated ER, 5x each-H  Comment #3: Towel assist IR, 5x-H  Exercise #4: All 4's rock forward/backward/side to side and lift left UEx5\"x5-H  Comment #4: INstruct in self CFM to subscapularis tendon.  reviewed  Exercise #5: prone shoulder flex, ext and horzonial ABD-reviewed technique, some pain with horizontal abduction  Comment #5: posture reviewed for sitting at computer HO issued  Exercise #6: UBE, seat #3, 1.0 power level, 2 min forward, 2 min backward  Comment #6: Table top/counter push-up, 10x-H  Exercise #7: Shoulder flex with 1#  to 90o, 10x-H  Comment #7: Shoulder scaption with 6 oz x10 with fatigue-H  Exercise #8: Wall rubs, 40\" CW, CCW up and down in painfree range-H  Comment #8: Self distraction arm forward/to side/to back, 20\" hold-H  Exercise #9: Right SL posterior capsule stretch, 10\" hold in painfree range-DC  Comment #9: Posterior capsule stretch standing with hand behind back, back against wall and rolling to right side-H  Exercise #10: Thoracic towel stretch placed vertically and horizontally and arm out to side, 30\"x3-H  Comment #10: Doorway shoulder ER stretch, 10\"x1-H  Exercise #11: Corner 3 way pectoral stretch, 10\"x1 each-H    Tolerated treatment well. Distraction to joint  feels good.      Treatment Today  9/8/2017    TREATMENT MINUTES " COMMENTS   Evaluation     Self-care/ Home management     Manual therapy 6 Grade 2-3 joint mobs, 30 oscillations:  Distraction, inferior glides, PA   Neuromuscular Re-education     Therapeutic Activity     Therapeutic Exercises 12 HEP per flow sheet; formalized HEP   Gait training     Modality___seated right AP shoulder and anterolateral arm_______________  US 3MHZ 100% 0.9 wt/cm2   ROM Assessment 10          Total 28    Blank areas are intentional and mean the treatment did not include these items.       Celestina MARK Teressa  9/8/2017        [facility] Discharge Summary  Patient Name: Hali Hebert  Date: 10/31/2017  Referral Diagnosis: Right rotator cuff tendonitis  Referring provider: Charo Lorenzo CNP  Visit Diagnosis:   1. Acute shoulder pain due to trauma, right     2. Decreased ROM of right shoulder     3. Muscle weakness (generalized)         Goals:  Pt. will demonstrate/verbalize independence in self-management of condition in : Met  Pt. will be independent with home exercise program in : Met  Patient will decrease : Met  by ___ points: 10  Pt will: Abrupt reaching overhead into cupboards with less pain/greater ease in 6 weeks-progress toward goal  Pt will: Be able to shampoo hair with less pain/greater ease in 6 weeks-met  Pt will: be able to place hands behind head, resting head in hands with less pain/greater ease in 6 weeks-met    Patient was seen for 5 visits from 8/3/2017 to 9/8/2017 with 1 cancelled appointment.  The patient met goals and has demonstrated understanding of and independence in the home program for self-care, and progression to next steps.  She will initiate contact if questions or concerns arise.  The patient reports feeling better and did not wish to schedule further therapy at this time.  Patient received a home program Shoulder ROM and shoulder/scapular strengthening  The patient was issued theraband and instructed in proper usage.    Therapy will be discontinued at this  time.  The patient will need a new referral to resume.    Thank you for your referral.  Celestina Gannon  10/31/2017  9:38 AM

## 2021-06-12 NOTE — PROGRESS NOTES
[facility] Daily Progress     Patient Name: Hali Hebert  PRECAUTIONS/RESTRICTIONS: none  Date: 2017  Visit #:3/12  PTA visit #:  1  Referral Diagnosis:  Right rotator cuff tendonitis  Referring provider: Charo Lorenzo CNP  Visit Diagnosis:     ICD-10-CM    1. Acute shoulder pain due to trauma, right M25.511     G89.11    2. Decreased ROM of right shoulder M25.611    3. Muscle weakness (generalized) M62.81          Assessment:     Complaint with HEP  Progress made with decreasing pain and increased function when shampooing, reaching forward>resting hands in head with hands behind head.  Pt would benefit with continuing PT visits  Meeting expectations    Goal Status: Ongoing  Pt. will demonstrate/verbalize independence in self-management of condition in : Progressing toward  Pt. will be independent with home exercise program in : Progressing toward  Patient will decrease : SPADI score;by _ points;for improved quality of function;for improved quality of life;in 6 weeks  by ___ points: 10  Pt will: Abrupt reaching overhead into cupboards with less pain/greater ease in 6 weeks-progress toward goal  Pt will: Be able to shampoo hair with less pain/greater ease in 6 weeks-progress toward  Pt will: be able to place hands behind head, resting head in hands with less pain/greater ease in 6 weeks-partially met    Plan / Patient Education:     Continue 1x then reassess needs, possibly decreasing to 1x every other week.  Continue POC  Continue US.  Add scaption to 90o   Consider MFR to AP shoulder    Subjective:     Pain Ratin/10 intermittent right shoulder pain, and hadn't exercised as much as instructed d/t being out of town.  Less frequent sharp pain when reaching forward.  Still sore but is working shoulder more  Computer positioning helpful  Some discomfort with horizontal abduction otherwise just noting exercise muscle soreness.    Response to PT/benefits: reaching forward and shampooing with less  "pain    Continued difficulties:lying on right side and placing hands behind head    Objective:   Shoulder/Elbow ROM            Date: 8/3/2017  *=pain  8/18/2017     Shoulder and Elbow ROM ( ) AROM in degrees AROM in degrees AROM in degrees    Right Left Right Left Right Left   Shoulder Flexion (0-180 ) 155 end range * and more with sustained elevation 155 155          Shoulder Abduction (0-180 ) 150 painful arc  To end range * 153 150 painful arc  To end range *          Shoulder Extension (0-60 ) 50 end range * 53 50         Shoulder ER (0-90 ) 80 end range * 85 70 end range pain          Shoulder IR (0-70 ) T10 end range * T10 with scapular pop T10          Shoulder IR/Ext               Elbow Flexion (150 )               Elbow Extension (0 )                   Today's Exercises:  OPT EXERCISES 8/18/2017   Exercise #5 prone shoulder flex, ext and horzonial ABD-reviewed technique, some pain with horizontal abduction   Comment #5    Exercise #6 UBE, seat #3, 1.0 power level, 2 min forward, 2 min backward   Comment #6 Wall push ups, 10x-H   Exercise #7 Shoulder flex with 6 oz to 90o, 10x-H   Comment #7 Shoulder scaption with 6 oz x1 with pain   Exercise #8 Wall rubs, 40\" CW, CCW up and down in painfree range-H     Tolerated treatment well. US feels good.      Treatment Today  8/18/2017    TREATMENT MINUTES COMMENTS   Evaluation     Self-care/ Home management     Manual therapy  CFM reviewed   Neuromuscular Re-education     Therapeutic Activity     Therapeutic Exercises 18 HEP per flow sheet   Gait training     Modality___seated right AP shoulder and anterolateral arm_______________ 8+3 set up US 3MHZ 100% 0.9 wt/cm2              Total 29    Blank areas are intentional and mean the treatment did not include these items.       Celestina Gannon  8/18/2017    "

## 2021-06-12 NOTE — PROGRESS NOTES
[facility]   Shoulder Initial Evaluation    Patient Name: Hali Hebert  PRECAUTIONS/RESTRICTIONS:  None  Date of evaluation: 8/3/2017  Referral Diagnosis: Right Rotator Cuff Tendonitis  Referring provider: Charo Lorenzo CNP  Visit Diagnosis:     ICD-10-CM    1. Decreased ROM of right shoulder M25.611    2. Acute shoulder pain due to trauma, right M25.511     G89.11    3. Muscle weakness (generalized) M62.81        Assessment:      Skilled PT is required to modulate pain, increase ROM, strength and function through modalities, manual therapy, exercise and education.  Pt. is appropriate for skilled PT intervention as outlined in the Plan of Care (POC).  Pt. is a good candidate for skilled PT services to improve pain levels and function.    Goals:  Pt. will demonstrate/verbalize independence in self-management of condition in : 6 weeks  Pt. will be independent with home exercise program in : 6 weeks  Patient will decrease : SPADI score;by _ points;for improved quality of function;for improved quality of life;in 6 weeks  by ___ points: 10  Pt will: Abrupt reaching overhead into cupboards with less pain/greater ease in 6 weeks  Pt will: Be able to shampoo hair with less pain/greater ease in 6 weeks  Pt will: be able to place hands behind head, resting head in hands with less pain/greater ease in 6 weeks    Patient's expectations/goals are realistic.    Barriers to Learning or Achieving Goals:  No Barriers.       Plan / Patient Instructions:        Plan of Care:   Communication with: Referral Source  Patient Related Instruction: Nature of Condition;Treatment plan and rationale;Self Care instruction;Basis of treatment;Posture;Precautions;Expected outcome;Next steps  Times per Week: 1  Number of Weeks: 12  Number of Visits: 12  Discharge Planning: Independent HEP and self-management of symptoms  Precautions / Restrictions : None  Therapeutic Exercise: ROM;Strengthening;Stretching  Neuromuscular Reeducation:  kinesio tape;posture  Manual Therapy: soft tissue mobilization;joint mobilization  Modalities: ultrasound;iontophoresis  Equipment: theraband    POC and pathology of condition were reviewed with patient.  Pt. is in agreement with the Plan of Care  A Home Exercise Program (HEP) was initiated today.  Pt. was instructed in exercises by PT and patient was given a handout with detailed instructions.    Plan for next visit: see 1x/week for 3 more visits then reassess needs.  Consider US, iontophoresis to subscapularis tendon, cont CFM, add scap stab prone horizontal abduction/extension/flex/scaption to 90o, wall rubs, wall push ups  .   Subjective:       Social information:      Occupation:   Work Status:  40 hours/week   Equipment Available: None    History of Present Illness:    Hali Hebert is a 56 y.o. female who presents to therapy today with chief complaints of persistent but improved right shoulder/lateral arm pain, onset 6/2017 after she tripped and braced fall on outstretched right UE, feeling a jolt go up her arm.  Now reports tweaks of pain when performing quick movements as when reaching overhead.  She notes muscle pain in arm with a lot of activity.  Increased discomforts after kayaking on 7/17/2017.  No prior episodes of right shoulder/arm pain.  Pt demo's signs and sx consistent with tendonitis/bursitis.     Pain Rating current:  2  Pain rating at best: 0  Pain rating at worst: 7  Pain description: no pain at rest or any UE numb/tingle but will feel muscle achiness and burning pain with quick movement such as reaching overhead or rotation of shoulder as when tearing a sheet of paper.    Functional limitations are described as occurring with:   reaching overhead into cupboards, shampooing hair, resting head in hand when arms are behind her neck    Patient reports benefit from:  rest         Objective:      Note: Items left blank indicates the item was not performed or not indicated at the time of  the evaluation.    Patient Outcome Measures :    Shoulder Pain and Disability Index (SPADI) in %: 27.69   Scores range from 0-100%, where a score of 0% represents minimal pain and maximal function. The minimal clincically important difference is a score reduction of 10%.    Shoulder Examination  1. Decreased ROM of right shoulder     2. Acute shoulder pain due to trauma, right     3. Muscle weakness (generalized)       Precautions/Restrictions: None    Involved side: Right    Posture Observation:  Standing:  C-protraction, left shoulder/scap/iliac crest high       Cervical Clearing: Normal    Flexibility:     Palpation: mild-mod right subscapularis tendon.  Joint Assessment:  Sternoclavicular Joint Assessment: WNL  Acromioclavicular Joint Assessment: WNL  Scapulothoracic Joint Assessment: Asymmetrical lowering.  Glenohumeral Joint Assessment:Posterior Capsule tightness.    Shoulder/Elbow ROM   Date: 8/3/2017  *=pain     Shoulder and Elbow ROM ( )   AROM in degrees AROM in degrees AROM in degrees    Right Left Right Left Right Left   Shoulder Flexion (0-180 ) 155 end range * and more with sustained elevation 155       Shoulder Abduction (0-180 ) 150 painful arc  To end range * 153       Shoulder Extension (0-60 ) 50 end range * 53       Shoulder ER (0-90 ) 80 end range * 85       Shoulder IR (0-70 ) T10 end range * T10 with scapular pop       Shoulder IR/Ext         Elbow Flexion (150 )         Elbow Extension (0 )          PROM in degrees PROM in degrees PROM in degrees    Right Left Right Left Right Left   Shoulder Flexion (0-180 ) 150 with pain at 100 without distraction; end range pain also        Shoulder Abduction (0-180 ) 150 end range pain        Shoulder Extension (0-60 )         Shoulder ER (0-90 ) 90 end range pain 95       Shoulder IR (0-70 ) 55 end range pain 65       Elbow Flexion (150 )         Elbow Extension (0 )           Shoulder/Elbow Strength Tested isometrically  Date: 8/3/2017    "  Shoulder/Elbow Strength (/5)  Manual Muscle Test (MMT) MMT MMT MMT    Right Left Right Left Right Left   Shoulder Flexion         Supraspinatus 5-        Shoulder Abduction \"Feels weak\"        Shoulder Extension         Shoulder External Rotation 4+        Shoulder Internal Rotation 4- with pain        Elbow Flexion Thumb up 5- with pain; palm up 5/5        Elbow Extension 5/5        Other:         Other:             Shoulder Special Tests     Impingement Cluster Right (+/-) Left (+/-) Rotator Cuff Tests Right (+/-) Left (+/-)   Briggs-Italo +  Drop Arm Sign     Painful Arc +  Hornblowers     Infraspinatus Test   ERLS     AC Tests Right (+/-) Left (+/-) IRLS     Active Compression   Labral Tests Right (+/-) Left (+/-)   Crossbody Adduction   Biceps Load Test II     AC Resisted Extension   Jerk Test     GH Instability Tests Right (+/-) Left (+/-) Bella Test     Sulcus Sign   Biceps Tests Right (+/-) Left (+/-)   Apprehension   Speed     Relocation   Kali de leon     Other:   Other:     Other:   Other:       Today's HEP:  Exercises:  Exercise #1: Discussed use of CP after aggressive activity  Comment #1: Discussed use of HP 2-3x/day  Exercise #2: Discussed avoiding shoulder abduction at gym and use of heavy weights for bicep curls  Comment #2: Cerv/scap retraction with row/pull down with green exercise band, 10x5\" each-H  Exercise #3: Wand supine flex/seated ER, 5x each-H  Comment #3: Towel assist IR, 5x-H  Exercise #4: All 4's rock forward/backward/side to side and lift left UEx5\"x5-H  Comment #4: INstruct in self CFM to subscapularis tendon.  HO issued    Tolerated exercises with good understandign and CFM felt good/helpful.    Treatment Today  TREATMENT MINUTES COMMENTS   Evaluation 30 Shoulder   Self-care/ Home management 10 See flow sheet   Manual therapy     Neuromuscular Re-education     Therapeutic Activity     Therapeutic Exercises 15 See flow sheet   Gait training     Modality__________________              "   Total 55    Blank areas are intentional and mean the treatment did not include these items.     PT Evaluation Code: (Please list factors)  Patient History/Comorbidities: see above  Examination: see above  Clinical Presentation: stable   Clinical Decision Making: low    Patient History/  Comorbidities Examination  (body structures and functions, activity limitations, and/or participation restrictions) Clinical Presentation Clinical Decision Making (Complexity)   No documented Comorbidities or personal factors 1-2 Elements Stable and/or uncomplicated Low   1-2 documented comorbidities or personal factor 3 Elements Evolving clinical presentation with changing characteristics Moderate   3-4 documented comorbidities or personal factors 4 or more Unstable and unpredictable High            Celestina Gannon  8/3/2017  7:46 AM

## 2021-06-12 NOTE — PROGRESS NOTES
"Nutrition Assessment    Patient seen for out patient MNT inital assessment.    Referring diagnosis: Hyperlipidemia (272.4)     Subjective:  Hali has been working on improving her cholesterol numbers with diet and exercise but notes that her cholesterol numbers are increasing.  She wants to avoid medications at this point.  She exercised daily and notes that she exercises more now that her kids are grown.      Meal recall:  Breakfast:  Oatmeal or barley-rye-oat flakes with soymilk.  Lunch:  She packs a lunch to eat at work.  It is usually peanuts/walnuts, fruit, veggie, PB & J on whole grain.  Sometimes has a granola bar and 2x/wk brings HB eggs.  Supper-varies, can be meat loaf, spaghetti, salmon or stir lazaro and veggies.  Snacks-crackers, cookies, hard candies, chocolate.     Supplements:  Fish oil.    Height:  62.75\"  Most recent weight: 125 last clinic visit.    Nutrition intervention that addressed medical nutrition therapy for above diagnosis:    Lipid lowering strategies:  Discussed increasing total and soluble fiber and plant stanol/sterols.  Also discussed cutting back on sugar as sugar increases TG.    RD reviewed Meal plan for decreasing cholesterol/TG.    Assessment of diet/weight history: Works on getting fruits and veggies daily.    Assessment of physical activity: Exercising daily.    Goals: 1.  Increase fiber in diet by increasing veggies at each meal, adding ground flaxseed to cereal at breakfast, continue to choose whole grains and choose fruit for dessert in place of sweets.    2.  Consider adding plant sterol/stanol.  Need 2-3 g/day of plant sterol/stanol to decrease LDL.  3.  Hali is considering a paleo diet which would focus on meat, veggies and fruit.      Patient had no barriers to learning, verbalized understanding, and compliance is expected.    Phone number provided for questions. Recommended follow-up in  as needed.    Thank you for this consult. Call me at [phone] for questions.    "

## 2021-06-16 NOTE — PROGRESS NOTES
FEMALE PREVENTIVE EXAM    Assessment & Plan   1. Routine general medical examination at a health care facility  Orders for mammogram and DEXA. Up to date on CRC screening and immunizations    2. Post-menopausal  FMH osteoporosis in mother, check early DEXA  - DXA Bone Density Scan; Future    3. Visit for screening mammogram  Fatty mass in left breast at 6 o'clock.  This is consistent with what has been seen on most recent US/mammogram.  Will simply continue with annual screening mammo  - Mammo Screening Bilateral; Future    4. Mixed hyperlipidemia  Recheck. Has been running daily and taking plant stanol    5. Screening for cholesterol level  - Lipid Cascade    6. Screening for diabetes mellitus  - Comprehensive Metabolic Panel    7. Screening for deficiency anemia  - Vitamin D, Total (25-Hydroxy)    8. Encounter for vitamin deficiency screening  - Hemoglobin    Recommended adequate calcium intake/osteoporosis prevention, Discussed breast cancer screening guidelines, Discussed colon cancer screening at age 50, 45 if -American and Discussed diet, including moderation of portions sizes, avoiding eating out and fast food and increase in fruits and vegetables    Charo Lorenzo CNP    Subjective:   Chief Complaint:  Annual Exam    HPI:  Hali Hebert is a 60 y.o. female who presents for routine physical exam.    She has been well.  Working from home. Her daughter, son in law and grandson moved in with them for a few months while looking for a house and this has been a happy distraction for her.  Got her COVID vaccine.      BCC:  Two on nose in the past two years. Plastic surgery to repair after. Now on daily Aldara to treat pre-cancerous cells.     Running on the treadmill 20 minutes/day for the past several weeks. Was training for a 10k prior to COVID and now trying to get back into it    OB/Gyn History:  Menstrual history: s/p hysterectomy    Preventive Health:  Reviewed and recommended screening and treatment  recommendations:  Mammography: due  Colonoscopy: due 2023  DEXA: osteoporosis in mother   Immunizations: up to date    Health Habits:    Exercise: running daily.  Calcium intake/Osteoporosis prevention: vitamin D 1000?  Calcium twice daily    PMH:   Patient Active Problem List   Diagnosis     Mixed hyperlipidemia       Past Medical History:   Diagnosis Date     Breast cyst      H/O hemorrhoidectomy 2014     Hx of skin cancer, basal cell 2012     S/P hysterectomy 2009       Current Medications: Reviewed   Current Outpatient Medications on File Prior to Visit   Medication Sig Dispense Refill     multivitamin therapeutic (THERAGRAN) tablet Take 1 tablet by mouth daily.       plant stanol sowmya (CHOLEST OFF) 450 mg Tab Take by mouth.       No current facility-administered medications on file prior to visit.        Allergies:  Reviewed  is allergic to keflex  [cephalexin].    Social History:  Social History     Occupational History     Not on file   Tobacco Use     Smoking status: Never Smoker     Smokeless tobacco: Never Used   Substance and Sexual Activity     Alcohol use: Yes     Alcohol/week: 3.0 standard drinks     Types: 3 Glasses of wine per week     Drug use: Not on file     Sexual activity: Not on file       Family History:   Family History   Problem Relation Age of Onset     Breast cancer Mother 60     Cancer Daughter 23         Review of Systems:  Complete head to toe review of systems is otherwise negative except as above.    Objective:    There were no vitals taken for this visit.    GENERAL: Alert, well-appearing  .   PSYCH: Pleasant mood, affect appropriate.    SKIN: No atypical lesions  EYES: Conjunctiva pink, sclera white, no exudates. JENNIFER.  EOMs intact.   EARS: TMs pearly grey, no bulging, redness, retraction.   MOUTH: Pharynx moist, pink without exudate. No tonsillar enlargement  NECK: No lymphadenopathy. Thyroid borders smooth without enlargement, nodules.   CV: Regular rate and rhythm without  murmurs, rubs or gallops.  RESP: Lung sounds clear  ABDOMEN: BS+. Abdomen soft.  No organomegaly  BREASTS: Breasts symmetric, no dimpling, masses or skin discolorations seen. Areolas and nipples symmetric without discharge. On palpation, there is an area of increased mass in the left breast at 6-7 o'clock. This is soft, fatty. Mildly tender to palpation.  No other masses or nodules. Axillary and epitrochlear lymph nodes nonpalpable.   PV :  No edema

## 2021-06-18 NOTE — PROGRESS NOTES
FEMALE PREVENTIVE EXAM    Assessment & Plan   1. Routine general medical examination at a health care facility:  Fasting labs, will check with insurance on coverage for Shingrix and return if covered. Discussed calcium and vitamin D supplementation.   - Varicella Zoster, Recombinant Vaccine IM; Standing    2. Screening for diabetes mellitus  - Glucose    3. Screening for cholesterol level:  Congratulated on dietary changes and weight loss. Will recheck lipids today and follow up with results.    - Lipid Cascade    4. Menopausal syndrome (hot flashes):  Discussed menopausal symptoms, variations in length of symptoms and limitations of hormone testing.  Will check FSH today. Also discussed vaginal estrogen for dryness and she will think about this and send a mychart if she would like to try it.    - Follicle Stimulating Hormone (FSH)    5. Encounter for vitamin deficiency screening  - Vitamin D, Total (25-Hydroxy)    6. Encounter for screening mammogram for breast cancer: ; Discussed screening guidelines.  With [facility] of breast cancer in mother at age 60 will plan to continue to screen annually for now.    - Mammo Screening Bilateral; Future    Recommended adequate calcium intake/osteoporosis prevention, Discussed breast cancer screening guidelines, Discussed colon cancer screening at age 50, 45 if -American and Discussed diet, including moderation of portions sizes, avoiding eating out and fast food and increase in fruits and vegetables    Charo Lorenzo, JESS    Subjective:   Chief Complaint:  Annual Exam (fasting)    HPI:  Hali Hebert is a 57 y.o. female who presents for routine physical exam.  She has been well. Shoulder pain completely resolved last summer with PT.  Her one daughter had a baby boy last September and her other daughter is due this September with a baby girl.      She has been feeling well. Met with a dietitian to address cholesterol and has made dramatic changes to diet. Focused on eliminating  simple carbohydrates. Regular exercise.  She is down seven pounds today.  States she would like to avoid a cholesterol medication if possible.      Hot flashes: Has noted hot flashes for the past 1-2 months. Underwent hysterectomy in 2009 though ovaries not taken.  She has not previously experienced hot flashes. No changes in mood or other symptoms.  She is wondering if she has been through menopause or is now experiencing this.      OB/Gyn History:  Menstrual history: s/p hysterectomy  Date of previous pap: s/p hysterectomy    Preventive Health:  Reviewed and recommended screening and treatment recommendations:  Mammography: 2017. Annually, mother with breast cancer at age 60  Colonoscopy: 2013  Immunizations: Shingrix due    Health Habits:    Exercise: yes, regularly.  Calcium intake/Osteoporosis prevention: no    PMH:   There is no problem list on file for this patient.      Past Medical History:   Diagnosis Date     Breast cyst      H/O hemorrhoidectomy 2014     S/P hysterectomy 2009       Current Medications: Reviewed   Current Outpatient Prescriptions on File Prior to Visit   Medication Sig Dispense Refill     multivitamin therapeutic (THERAGRAN) tablet Take 1 tablet by mouth daily.       omega 3-dha-epa-fish oil (FISH OIL) 100-160-1,000 mg cap Take by mouth.       calcium carbonate (OS-RADHA) 600 mg calcium (1,500 mg) tablet Take 600 mg by mouth 2 (two) times a day with meals.       No current facility-administered medications on file prior to visit.        Allergies:  Reviewed  has No Known Allergies.    Social History:  Social History     Occupational History     Not on file.     Social History Main Topics     Smoking status: Never Smoker     Smokeless tobacco: Never Used     Alcohol use 1.8 oz/week     3 Glasses of wine per week     Drug use: Not on file     Sexual activity: Not on file       Family History:   Family History   Problem Relation Age of Onset     Breast cancer Mother      Cancer Daughter 23  "        Review of Systems:  Complete head to toe review of systems is otherwise negative except as above.    Objective:    /62 (Patient Site: Right Arm, Patient Position: Sitting, Cuff Size: Adult Regular)  Pulse 60  Ht 5' 2.5\" (1.588 m)  Wt 118 lb (53.5 kg)  SpO2 100%  BMI 21.24 kg/m2    GENERAL: Alert, well-appearing female  PSYCH: Pleasant mood, affect appropriate.    SKIN: No atypical lesions  EYES: Conjunctiva pink, sclera white, no exudates. JENNIFER.  EOMs intact.   EARS: TMs pearly grey, no bulging, redness, retraction.   MOUTH: Pharynx moist, pink without exudate. No tonsillar enlargement  NECK: No lymphadenopathy. Thyroid borders smooth without enlargement, nodules.   CV: Regular rate and rhythm without murmurs, rubs or gallops.  RESP: Lung sounds clear  ABDOMEN: BS+. Abdomen soft.  No organomegaly  BREASTS: Breasts symmetric, no dimpling, masses or skin discolorations seen. Areolas and nipples symmetric without discharge. On palpation, breast tissue supple and nontender. No masses or nodules. Axillary and epitrochlear lymph nodes nonpalpable.   PV :  No edema        "

## 2021-06-20 ENCOUNTER — HEALTH MAINTENANCE LETTER (OUTPATIENT)
Age: 61
End: 2021-06-20

## 2021-06-21 NOTE — PROGRESS NOTES
Assessment & Plan   1. Lightheadedness  Intermittent sensation of lightheadedness for the last month.  This does not sound consistent with vertigo.  She does bilateral effusions on exam.  I suspect that eustachian tube dysfunction is playing a role in this.  Recommended a trial of nasal corticosteroid twice daily for 2 weeks.  Will check lab work today.  Exam is otherwise normal, reassured her that this will likely improve on its own.   - HM2(CBC w/o Differential)  - Ferritin  - Vitamin B12  - Comprehensive Metabolic Panel  - Thyroid Cascade    Charo Lorenzo CNP    Subjective   Chief Complaint:  Dizziness (intermitent dizziness x 2 weeks; has had a little bit of sinus pressure)    HPI:   Hali Hebert is a 58 y.o. female who presents for dizziness.     She states for the last month she has experienced a sensation of lightheadedness intermittently.  She describes this as a sensation of being off balance.  Denies sensation of the room spinning.  Comes on at random typically every couple of days.  Last for an hour or so.  She has not made any other associations with onset of symptoms.  Does not seem to happen when she moves from sitting to standing.  It does not seem to happen with movements of the head.  She does notice it more on the weekend when she is out and about.  During the week with her job she is fairly sedentary sitting at a desk all day.  She denies any weakness, numbness, tingling.  No palpitations.  She has otherwise felt well without fever or illness.  She has noted persistent sinus pressure for the last month and a half.  She assumed this was due to allergies but has not really improved.  No discomfort with the pressure no drainage from the sinuses.  No ear pain.      Allergies:  has No Known Allergies.    SH/FH:  Social History and Family History reviewed and updated.   Tobacco Status:  She  reports that  has never smoked. she has never used smokeless tobacco.    Review of Systems:  A complete head to  toe ROS is negative unless otherwise noted in HPI    Objective     Vitals:    11/14/18 0720   BP: 112/60   Patient Site: Left Arm   Patient Position: Sitting   Cuff Size: Adult Regular   Pulse: 76   Weight: 130 lb 4 oz (59.1 kg)       Physical Exam:  GENERAL: Alert, well-appearing female   PSYCH: Pleasant mood, affect appropriate.   EYES: Conjunctiva pink, sclera white, no exudates. JENNIFER.  EOMs intact.   EARS: Bilateral TMs with serous effusion. No bulging or erythema.    NOSE: Nares patent, no discharge.  Normal nasal mucosa, septum and turbinates.  MOUTH: Pharynx moist, pink without exudate. No tonsillar enlargement  NECK: No lymphadenopathy. Thyroid borders smooth without enlargement, nodules.   CV: Regular rate and rhythm without murmurs, rubs or gallops.  RESP: Lung sounds clear  NEURO: Alert, oriented x3 CNs 2-12 intact. DTRs 2/4. Normal gait. Negative Romberg.

## 2021-06-25 NOTE — PROGRESS NOTES
Progress Notes by Maxime Eubanks PTA at 2017  7:30 AM     Author: Maxime Eubanks PTA Service: -- Author Type: Physical Therapist Assistant    Filed: 2017  9:34 AM Encounter Date: 2017 Status: Attested    : Maxime Eubanks PTA (Physical Therapist Assistant) Cosigner: Celestina Gannon, PT at 8/10/2017  6:26 AM    Attestation signed by Celestina Gannon PT at 8/10/2017  6:26 AM    Initiated US per treatment plan.  Parameters discussed with and treatment performed by PTA under PT supervision.  Celestina Gannon PT                [facility] Daily Progress     Patient Name: Hali Hebert  PRECAUTIONS/RESTRICTIONS: none  Date: 2017  Visit #: 2  PTA visit #:  1  Referral Diagnosis:  Right rotator cuff tendonitis  Referring provider: Charo Lorenzo CNP  Visit Diagnosis:     ICD-10-CM    1. Acute shoulder pain due to trauma, right M25.511     G89.11    2. Decreased ROM of right shoulder M25.611    3. Muscle weakness (generalized) M62.81          Assessment:     Cue for HEP/posture needed  Complaint with HEP  Pt would benefit with continuing PT visits    Goal Status: Ongoing  Pt. will demonstrate/verbalize independence in self-management of condition in : 6 weeks  Pt. will be independent with home exercise program in : 6 weeks  Patient will decrease : SPADI score;by _ points;for improved quality of function;for improved quality of life;in 6 weeks  by ___ points: 10  Pt will: Abrupt reaching overhead into cupboards with less pain/greater ease in 6 weeks  Pt will: Be able to shampoo hair with less pain/greater ease in 6 weeks  Pt will: be able to place hands behind head, resting head in hands with less pain/greater ease in 6 weeks    Plan / Patient Education:     Continue POC  Progress ex as able  Review prone ex add wall rubs,UBE  Assess US    Subjective:     Pain Ratin/10 intermittent right shoulder pain, haven't iced as much as I shoulder  Sleeping sore lying on  right side    Response to PT/benefits: some relief with ex and CP    Continued difficulties:lying on right side    Objective:     Cues for HEP/posture needed  Today's Exercises:added prone horizonal ABD, ext, flex   Posture at computer reviewed HO issued  Added US to right anterior shoulder seated  Reviewed CFM  Tolerated treatment well      Treatment Today  8/9/2017    TREATMENT MINUTES COMMENTS   Evaluation     Self-care/ Home management     Manual therapy 5 CFM reviewed   Neuromuscular Re-education     Therapeutic Activity     Therapeutic Exercises 15 HEP per flow sheet   Gait training     Modality__________________ 10 US 3MHZ 100% 0.9 wt/cm2              Total 30    Blank areas are intentional and mean the treatment did not include these items.       Maxime Eubanks  8/9/2017

## 2021-10-10 ENCOUNTER — HEALTH MAINTENANCE LETTER (OUTPATIENT)
Age: 61
End: 2021-10-10

## 2022-01-16 ENCOUNTER — DOCUMENTATION ONLY (OUTPATIENT)
Dept: OTHER | Facility: CLINIC | Age: 62
End: 2022-01-16

## 2022-04-26 ENCOUNTER — E-VISIT (OUTPATIENT)
Dept: INTERNAL MEDICINE | Facility: CLINIC | Age: 62
End: 2022-04-26
Payer: COMMERCIAL

## 2022-04-26 DIAGNOSIS — F41.0 PANIC DISORDER WITHOUT AGORAPHOBIA: ICD-10-CM

## 2022-04-26 DIAGNOSIS — F41.1 GENERALIZED ANXIETY DISORDER: ICD-10-CM

## 2022-04-26 PROCEDURE — 99422 OL DIG E/M SVC 11-20 MIN: CPT | Performed by: NURSE PRACTITIONER

## 2022-04-26 ASSESSMENT — ANXIETY QUESTIONNAIRES
2. NOT BEING ABLE TO STOP OR CONTROL WORRYING: MORE THAN HALF THE DAYS
1. FEELING NERVOUS, ANXIOUS, OR ON EDGE: MORE THAN HALF THE DAYS
3. WORRYING TOO MUCH ABOUT DIFFERENT THINGS: MORE THAN HALF THE DAYS
7. FEELING AFRAID AS IF SOMETHING AWFUL MIGHT HAPPEN: SEVERAL DAYS
7. FEELING AFRAID AS IF SOMETHING AWFUL MIGHT HAPPEN: SEVERAL DAYS
GAD7 TOTAL SCORE: 10
6. BECOMING EASILY ANNOYED OR IRRITABLE: NOT AT ALL
5. BEING SO RESTLESS THAT IT IS HARD TO SIT STILL: SEVERAL DAYS
8. IF YOU CHECKED OFF ANY PROBLEMS, HOW DIFFICULT HAVE THESE MADE IT FOR YOU TO DO YOUR WORK, TAKE CARE OF THINGS AT HOME, OR GET ALONG WITH OTHER PEOPLE?: NOT DIFFICULT AT ALL
GAD7 TOTAL SCORE: 10
GAD7 TOTAL SCORE: 10
4. TROUBLE RELAXING: MORE THAN HALF THE DAYS

## 2022-04-27 RX ORDER — LORAZEPAM 0.5 MG/1
0.5 TABLET ORAL EVERY 6 HOURS PRN
Qty: 30 TABLET | Refills: 0 | Status: SHIPPED | OUTPATIENT
Start: 2022-04-27 | End: 2022-05-27

## 2022-04-27 RX ORDER — ESCITALOPRAM OXALATE 10 MG/1
10 TABLET ORAL DAILY
Qty: 30 TABLET | Refills: 1 | Status: SHIPPED | OUTPATIENT
Start: 2022-04-27 | End: 2022-06-02

## 2022-04-27 ASSESSMENT — ANXIETY QUESTIONNAIRES: GAD7 TOTAL SCORE: 10

## 2022-04-27 NOTE — PATIENT INSTRUCTIONS
Using Antidepressants  Depression is a mood disorder that affects the way you think and feel. The most common symptom is a feeling of deep sadness. This feeling doesn't go away or get better on its own. But most types of depression can be helped with therapy and antidepressant medicines. (Note: This covers antidepressant use in adults only.)     What do antidepressants do?  Antidepressants restore the balance of certain chemicals in your brain to help ease your depression. You will likely feel better in 4 to 6 weeks. But you may continue taking antidepressants for a year or more to keep your symptoms from coming back. Some people with depression need to take antidepressants for life. There are several types of antidepressants. The main types are described below.   Selective serotonin reuptake inhibitors (SSRIs)  SSRIs are effective medicines for the treatment of depression. They tend to have fewer side effects than other antidepressants. Possible side effects include anxiety, trouble sleeping, nausea, diarrhea, sexual dysfunction, and headaches. In rare cases, they may make you more depressed. SSRIs shouldn t be mixed with certain other medicines. Talk with your healthcare provider about all the medicines, herbs, and supplements you are taking.   Tricyclic antidepressants  Tricyclics help severe or long-term depression. They have been used for many years with good results. Possible side effects include blurred vision, dry mouth, and constipation.   Monoamine oxidase inhibitors (MAOIs)  If you don t respond to tricyclics or SSRIs, your healthcare provider may prescribe MAOIs. These medicines can be very effective. But people taking MAOIs must stay away from certain foods and medicines. Your healthcare provider can tell you more.   Lithium  If you have bipolar disorder, you may take a medicine called lithium. This medicine helps even out your mood. Possible side effects are weight gain, trembling, loose stool, and  nausea. Lithium is also used:     For people who have unipolar depression and have not responded to other antidepressants    For people who have a sudden (acute) episode of unipolar depression    As a maintenance medicine to prevent unipolar depression from happening again  Things to stay away from if you are taking MAOIs   If you are taking MAOIs, don t have any of the following:     Beans    Aged cheese    Chocolate    Red wine    Most cold medicines    Certain medicines (ask your healthcare provider)  To reduce the risk of lithium poisoning  You can reduce the risk of lithium poisoning by following this advice:    Take only the prescribed amount of lithium. If your depressive symptoms get worse, contact your healthcare provider. Never increase or decrease your medicine on your own.    Drink plenty of fluids other than coffee, tea, and soda.    Limit salt in your diet.    Before using other prescribed medicines or over-the-counter medicines, check with your pharmacist. This is to be sure the medicines won t interact with the lithium.    Never share your medicines or use another person's medicines, even if it is the same medicine and dose.    Keep follow-up appointments.    Have your lithium blood level checked as advised. You will need blood work more often when symptoms are not under control.  If you have side effects  The side effects of antidepressants are usually mild. But if you have troubling side effects, call your healthcare provider. Changing the dosage or type of medicine may help. Never stop taking medicines on your own.   Rubén last reviewed this educational content on 9/1/2019 2000-2021 [employer]. All rights reserved. This information is not intended as a substitute for professional medical care. Always follow your healthcare professional's instructions.

## 2022-04-27 NOTE — TELEPHONE ENCOUNTER
Provider E-Visit time total (minutes): 15    Assessment & Plan   1. Generalized anxiety disorder  Start on Lexapro and follow up four weeks. Advised on side effects.   - escitalopram (LEXAPRO) 10 MG tablet; Take 1 tablet (10 mg) by mouth daily  Dispense: 30 tablet; Refill: 1    2. Panic disorder without agoraphobia  Prn lorazepam for acute panic  - escitalopram (LEXAPRO) 10 MG tablet; Take 1 tablet (10 mg) by mouth daily  Dispense: 30 tablet; Refill: 1  - LORazepam (ATIVAN) 0.5 MG tablet; Take 1 tablet (0.5 mg) by mouth every 6 hours as needed for anxiety  Dispense: 30 tablet; Refill: 0    Charo Lorenzo CNP    Subjective     HPI:   Hali Hebert is a 61 year old female who presents for anxiety      Allergies:  is allergic to keflex  [cephalexin].    SH/FH:  Social History and Family History reviewed and updated.   Tobacco Status:  She  reports that she has never smoked. She has never used smokeless tobacco.    Review of Systems:  A complete head to toe ROS is negative unless otherwise noted in HPI

## 2022-05-21 ENCOUNTER — HEALTH MAINTENANCE LETTER (OUTPATIENT)
Age: 62
End: 2022-05-21

## 2022-05-23 ENCOUNTER — MYC MEDICAL ADVICE (OUTPATIENT)
Dept: FAMILY MEDICINE | Facility: CLINIC | Age: 62
End: 2022-05-23

## 2022-05-23 DIAGNOSIS — F41.1 GAD (GENERALIZED ANXIETY DISORDER): Primary | ICD-10-CM

## 2022-05-27 ENCOUNTER — MYC REFILL (OUTPATIENT)
Dept: INTERNAL MEDICINE | Facility: CLINIC | Age: 62
End: 2022-05-27

## 2022-05-27 DIAGNOSIS — F41.0 PANIC DISORDER WITHOUT AGORAPHOBIA: ICD-10-CM

## 2022-05-27 RX ORDER — LORAZEPAM 0.5 MG/1
0.5 TABLET ORAL EVERY 6 HOURS PRN
Qty: 30 TABLET | Refills: 0 | Status: SHIPPED | OUTPATIENT
Start: 2022-05-27 | End: 2022-08-16

## 2022-05-27 NOTE — TELEPHONE ENCOUNTER
Controlled Substance Refill Request for LORazepam (ATIVAN) 0.5 MG tablet    Last refill order signed: 04/27/22 - 30 tablets w/ 0 refills   Sig - Route: Take 1 tablet (0.5 mg) by mouth every 6 hours as needed for anxiety - Oral    Last clinic visit: 04/15/21 - office visit w/ Dr. Lorenzo     Clinic visit frequency required: Q 1 month  Next appt: 06/02/22 w/ Dr. Lorenzo    Controlled substance agreement on file: No.    Documentation in problem list reviewed:  Yes  Associated Dx: Panic disorder without agoraphobia     Processing:  Rx to be electronically transmitted to pharmacy by provider      Heike Valle RN  [facility]    2:57 PM, May 27, 2022

## 2022-05-27 NOTE — TELEPHONE ENCOUNTER
Routing refill request to provider for review/approval because:  Drug not on the [facility] refill protocol     Last Written Prescription Date:  4/27/22  Last Fill Quantity: 30,  # refills: 0   Last office visit provider:  4/15/21     Requested Prescriptions   Pending Prescriptions Disp Refills     LORazepam (ATIVAN) 0.5 MG tablet 30 tablet 0     Sig: Take 1 tablet (0.5 mg) by mouth every 6 hours as needed for anxiety       There is no refill protocol information for this order          Katie Brand, RN 05/27/22 2:50 PM

## 2022-05-31 ASSESSMENT — ENCOUNTER SYMPTOMS
DYSURIA: 0
NAUSEA: 0
SORE THROAT: 0
MYALGIAS: 0
NERVOUS/ANXIOUS: 1
HEARTBURN: 0
ARTHRALGIAS: 0
FEVER: 0
JOINT SWELLING: 0
PARESTHESIAS: 0
DIARRHEA: 0
COUGH: 0
EYE PAIN: 0
WEAKNESS: 0
CONSTIPATION: 0
FREQUENCY: 0
SHORTNESS OF BREATH: 0
ABDOMINAL PAIN: 0
HEMATOCHEZIA: 0
CHILLS: 0
HEMATURIA: 0
PALPITATIONS: 0
HEADACHES: 0
DIZZINESS: 0
BREAST MASS: 0

## 2022-06-02 ENCOUNTER — OFFICE VISIT (OUTPATIENT)
Dept: FAMILY MEDICINE | Facility: CLINIC | Age: 62
End: 2022-06-02
Payer: COMMERCIAL

## 2022-06-02 VITALS
BODY MASS INDEX: 22.21 KG/M2 | OXYGEN SATURATION: 98 % | HEIGHT: 62 IN | SYSTOLIC BLOOD PRESSURE: 110 MMHG | DIASTOLIC BLOOD PRESSURE: 58 MMHG | HEART RATE: 78 BPM | WEIGHT: 120.7 LBS

## 2022-06-02 DIAGNOSIS — Z00.00 ROUTINE GENERAL MEDICAL EXAMINATION AT A HEALTH CARE FACILITY: Primary | ICD-10-CM

## 2022-06-02 DIAGNOSIS — Z13.1 SCREENING FOR DIABETES MELLITUS: ICD-10-CM

## 2022-06-02 DIAGNOSIS — F41.1 GAD (GENERALIZED ANXIETY DISORDER): ICD-10-CM

## 2022-06-02 DIAGNOSIS — H91.93 BILATERAL HEARING LOSS, UNSPECIFIED HEARING LOSS TYPE: ICD-10-CM

## 2022-06-02 DIAGNOSIS — Z13.0 SCREENING FOR DEFICIENCY ANEMIA: ICD-10-CM

## 2022-06-02 DIAGNOSIS — E78.2 MIXED HYPERLIPIDEMIA: ICD-10-CM

## 2022-06-02 PROCEDURE — 91305 COVID-19,PF,PFIZER (12+ YRS): CPT | Performed by: NURSE PRACTITIONER

## 2022-06-02 PROCEDURE — 99213 OFFICE O/P EST LOW 20 MIN: CPT | Mod: 25 | Performed by: NURSE PRACTITIONER

## 2022-06-02 PROCEDURE — 99396 PREV VISIT EST AGE 40-64: CPT | Mod: 25 | Performed by: NURSE PRACTITIONER

## 2022-06-02 PROCEDURE — 0054A COVID-19,PF,PFIZER (12+ YRS): CPT | Performed by: NURSE PRACTITIONER

## 2022-06-02 RX ORDER — NIACINAMIDE 500 MG
TABLET ORAL
COMMUNITY

## 2022-06-02 RX ORDER — TRETINOIN 1 MG/G
CREAM TOPICAL
COMMUNITY
Start: 2022-03-08

## 2022-06-02 ASSESSMENT — ENCOUNTER SYMPTOMS
SORE THROAT: 0
HEMATOCHEZIA: 0
DYSURIA: 0
NAUSEA: 0
CONSTIPATION: 0
HEADACHES: 0
SHORTNESS OF BREATH: 0
CHILLS: 0
FREQUENCY: 0
HEMATURIA: 0
MYALGIAS: 0
ARTHRALGIAS: 0
WEAKNESS: 0
EYE PAIN: 0
HEARTBURN: 0
COUGH: 0
JOINT SWELLING: 0
PARESTHESIAS: 0
FEVER: 0
DIZZINESS: 0
NERVOUS/ANXIOUS: 1
DIARRHEA: 0
ABDOMINAL PAIN: 0
BREAST MASS: 0
PALPITATIONS: 0

## 2022-06-02 NOTE — PROGRESS NOTES
SUBJECTIVE:   CC: Hali Hebert is an 61 year old woman who presents for preventive health visit.     Anxiety:  Submitted e-visit in April for anxiety.  Episodes of panic attacks. Daily anxiety.  Started on Lexapro, lorazepam prn and referred for therapy. She sent a message last week that she felt anxiety was actually increased on the Lexapro.  Stopped this and was prescribed sertraline.  She states since stopping the Lexapro she has felt much better.  Has not had to use lorazepam for the past several days.  She would like to discuss starting sertraline.    Establishing with therapist tomorrow.   Still awaiting information on foster care licensing.  Application was submitted to the state months ago. Feeling hopeful about this.   Two daughters getting  this year. Looking forward to this.     HM: up to date on mammogram, CRC screening.  S/p hysterectomy    Patient has been advised of split billing requirements and indicates understanding: Yes  Healthy Habits:     Getting at least 3 servings of Calcium per day:  NO    Bi-annual eye exam:  Yes    Dental care twice a year:  Yes    Sleep apnea or symptoms of sleep apnea:  None    Diet:  Regular (no restrictions)    Frequency of exercise:  6-7 days/week    Duration of exercise:  30-45 minutes    Taking medications regularly:  Yes    Medication side effects:  None    PHQ-2 Total Score: 0    Additional concerns today:  No          Today's PHQ-2 Score:   PHQ-2 ( 1999 Pfizer) 5/31/2022   Q1: Little interest or pleasure in doing things 0   Q2: Feeling down, depressed or hopeless 0   PHQ-2 Score 0   Q1: Little interest or pleasure in doing things Not at all   Q2: Feeling down, depressed or hopeless Not at all   PHQ-2 Score 0       Abuse: Current or Past (Physical, Sexual or Emotional) - No  Do you feel safe in your environment? Yes        Social History     Tobacco Use     Smoking status: Never Smoker     Smokeless tobacco: Never Used   Substance Use Topics     Alcohol  use: Yes     Alcohol/week: 3.0 standard drinks     If you drink alcohol do you typically have >3 drinks per day or >7 drinks per week? No    No flowsheet data found.    Reviewed orders with patient.  Reviewed health maintenance and updated orders accordingly - Yes      Breast Cancer Screening:    FHS-7:   Breast CA Risk Assessment (FHS-7) 5/31/2022   Did any of your first-degree relatives have breast or ovarian cancer? Yes   Did any of your relatives have bilateral breast cancer? No   Did any man in your family have breast cancer? No   Did any woman in your family have breast and ovarian cancer? Yes   Did any woman in your family have breast cancer before age 50 y? No   Do you have 2 or more relatives with breast and/or ovarian cancer? No   Do you have 2 or more relatives with breast and/or bowel cancer? No       Mammogram Screening: Recommended mammography every 1-2 years with patient discussion and risk factor consideration  Pertinent mammograms are reviewed under the imaging tab.    History of abnormal Pap smear: Status post benign hysterectomy. Health Maintenance and Surgical History updated.     Reviewed and updated as needed this visit by clinical staff   Tobacco   Meds                Reviewed and updated as needed this visit by Provider                       Review of Systems   Constitutional: Negative for chills and fever.   HENT: Negative for congestion, ear pain, hearing loss and sore throat.    Eyes: Negative for pain and visual disturbance.   Respiratory: Negative for cough and shortness of breath.    Cardiovascular: Negative for chest pain, palpitations and peripheral edema.   Gastrointestinal: Negative for abdominal pain, constipation, diarrhea, heartburn, hematochezia and nausea.   Breasts:  Negative for tenderness, breast mass and discharge.   Genitourinary: Negative for dysuria, frequency, genital sores, hematuria, pelvic pain, urgency, vaginal bleeding and vaginal discharge.   Musculoskeletal:  "Negative for arthralgias, joint swelling and myalgias.   Skin: Negative for rash.   Neurological: Negative for dizziness, weakness, headaches and paresthesias.   Psychiatric/Behavioral: Negative for mood changes. The patient is nervous/anxious.           OBJECTIVE:   /58 (BP Location: Left arm, Patient Position: Sitting, Cuff Size: Adult Regular)   Pulse 78   Ht 1.575 m (5' 2\")   Wt 54.7 kg (120 lb 11.2 oz)   SpO2 98%   BMI 22.08 kg/m    Physical Exam  GENERAL: healthy, alert and no distress  EYES: Eyes grossly normal to inspection, PERRL and conjunctivae and sclerae normal  HENT: ear canals and TM's normal, nose and mouth without ulcers or lesions  NECK: no adenopathy, no asymmetry, masses, or scars and thyroid normal to palpation  RESP: lungs clear to auscultation - no rales, rhonchi or wheezes  BREAST: normal without masses, tenderness or nipple discharge and no palpable axillary masses or adenopathy  CV: regular rate and rhythm, normal S1 S2, no S3 or S4, no murmur, click or rub, no peripheral edema and peripheral pulses strong  ABDOMEN: soft, nontender, no hepatosplenomegaly, no masses and bowel sounds normal  MS: no gross musculoskeletal defects noted, no edema  SKIN: no suspicious lesions or rashes  NEURO: Normal strength and tone, mentation intact and speech normal  PSYCH: mentation appears normal, affect normal/bright    Diagnostic Test Results:  Labs reviewed in Epic    ASSESSMENT/PLAN:   1. Routine general medical examination at a health care facility  Fasting labs ordered and will return for these. Up to date on mammogram.  Colonoscopy due next year. COVID booster today    2. ERIK (generalized anxiety disorder)  Started on lexapro one month ago and actually experienced worsening anxiety with this medication.  Improvement after stopping.  She is still interested in treating daily anxiety as it continues to be high. Prescription sent for sertraline.  Recheck 4-6 weeks and encouraged to reach out " "sooner if worsening mood.    - TSH with free T4 reflex; Future    3. Bilateral hearing loss, unspecified hearing loss type  - Adult Audiology  Referral; Future    4. Mixed hyperlipidemia  - Lipid Profile (Chol, Trig, HDL, LDL calc); Future    5. Screening for diabetes mellitus  - Comprehensive metabolic panel (BMP + Alb, Alk Phos, ALT, AST, Total. Bili, TP); Future    6. Screening for deficiency anemia  - CBC with platelets; Future    Patient has been advised of split billing requirements and indicates understanding: Yes    COUNSELING:  Reviewed preventive health counseling, as reflected in patient instructions    Estimated body mass index is 22.08 kg/m  as calculated from the following:    Height as of this encounter: 1.575 m (5' 2\").    Weight as of this encounter: 54.7 kg (120 lb 11.2 oz).        She reports that she has never smoked. She has never used smokeless tobacco.      Counseling Resources:  ATP IV Guidelines  Pooled Cohorts Equation Calculator  Breast Cancer Risk Calculator  BRCA-Related Cancer Risk Assessment: FHS-7 Tool  FRAX Risk Assessment  ICSI Preventive Guidelines  Dietary Guidelines for Americans, 2010  USDA's MyPlate  ASA Prophylaxis  Lung CA Screening    Charo Lorenzo, CNP  [facility]  "

## 2022-06-06 ENCOUNTER — LAB (OUTPATIENT)
Dept: LAB | Facility: CLINIC | Age: 62
End: 2022-06-06
Payer: COMMERCIAL

## 2022-06-06 DIAGNOSIS — Z13.0 SCREENING FOR DEFICIENCY ANEMIA: ICD-10-CM

## 2022-06-06 DIAGNOSIS — Z13.1 SCREENING FOR DIABETES MELLITUS: ICD-10-CM

## 2022-06-06 DIAGNOSIS — E78.2 MIXED HYPERLIPIDEMIA: ICD-10-CM

## 2022-06-06 DIAGNOSIS — Z11.4 SCREENING FOR HIV (HUMAN IMMUNODEFICIENCY VIRUS): ICD-10-CM

## 2022-06-06 DIAGNOSIS — Z11.59 NEED FOR HEPATITIS C SCREENING TEST: ICD-10-CM

## 2022-06-06 DIAGNOSIS — F41.1 GAD (GENERALIZED ANXIETY DISORDER): ICD-10-CM

## 2022-06-06 LAB
ALBUMIN SERPL-MCNC: 4.1 G/DL (ref 3.5–5)
ALP SERPL-CCNC: 63 U/L (ref 45–120)
ALT SERPL W P-5'-P-CCNC: 21 U/L (ref 0–45)
ANION GAP SERPL CALCULATED.3IONS-SCNC: 10 MMOL/L (ref 5–18)
AST SERPL W P-5'-P-CCNC: 26 U/L (ref 0–40)
BILIRUB SERPL-MCNC: 0.6 MG/DL (ref 0–1)
BUN SERPL-MCNC: 12 MG/DL (ref 8–22)
CALCIUM SERPL-MCNC: 9.4 MG/DL (ref 8.5–10.5)
CHLORIDE BLD-SCNC: 103 MMOL/L (ref 98–107)
CHOLEST SERPL-MCNC: 293 MG/DL
CO2 SERPL-SCNC: 28 MMOL/L (ref 22–31)
CREAT SERPL-MCNC: 0.77 MG/DL (ref 0.6–1.1)
ERYTHROCYTE [DISTWIDTH] IN BLOOD BY AUTOMATED COUNT: 13 % (ref 10–15)
FASTING STATUS PATIENT QL REPORTED: YES
GFR SERPL CREATININE-BSD FRML MDRD: 88 ML/MIN/1.73M2
GLUCOSE BLD-MCNC: 93 MG/DL (ref 70–125)
HCT VFR BLD AUTO: 42.4 % (ref 35–47)
HCV AB SERPL QL IA: NONREACTIVE
HDLC SERPL-MCNC: 82 MG/DL
HGB BLD-MCNC: 13.7 G/DL (ref 11.7–15.7)
HIV 1+2 AB+HIV1 P24 AG SERPL QL IA: NEGATIVE
LDLC SERPL CALC-MCNC: 187 MG/DL
MCH RBC QN AUTO: 29.8 PG (ref 26.5–33)
MCHC RBC AUTO-ENTMCNC: 32.3 G/DL (ref 31.5–36.5)
MCV RBC AUTO: 92 FL (ref 78–100)
PLATELET # BLD AUTO: 244 10E3/UL (ref 150–450)
POTASSIUM BLD-SCNC: 3.8 MMOL/L (ref 3.5–5)
PROT SERPL-MCNC: 7.2 G/DL (ref 6–8)
RBC # BLD AUTO: 4.59 10E6/UL (ref 3.8–5.2)
SODIUM SERPL-SCNC: 141 MMOL/L (ref 136–145)
TRIGL SERPL-MCNC: 118 MG/DL
TSH SERPL DL<=0.005 MIU/L-ACNC: 4.42 UIU/ML (ref 0.3–5)
WBC # BLD AUTO: 3.6 10E3/UL (ref 4–11)

## 2022-06-06 PROCEDURE — 85027 COMPLETE CBC AUTOMATED: CPT

## 2022-06-06 PROCEDURE — 80053 COMPREHEN METABOLIC PANEL: CPT

## 2022-06-06 PROCEDURE — 80061 LIPID PANEL: CPT

## 2022-06-06 PROCEDURE — 86803 HEPATITIS C AB TEST: CPT

## 2022-06-06 PROCEDURE — 84443 ASSAY THYROID STIM HORMONE: CPT

## 2022-06-06 PROCEDURE — 87389 HIV-1 AG W/HIV-1&-2 AB AG IA: CPT

## 2022-06-06 PROCEDURE — 36415 COLL VENOUS BLD VENIPUNCTURE: CPT

## 2022-07-05 ASSESSMENT — ANXIETY QUESTIONNAIRES
8. IF YOU CHECKED OFF ANY PROBLEMS, HOW DIFFICULT HAVE THESE MADE IT FOR YOU TO DO YOUR WORK, TAKE CARE OF THINGS AT HOME, OR GET ALONG WITH OTHER PEOPLE?: NOT DIFFICULT AT ALL
5. BEING SO RESTLESS THAT IT IS HARD TO SIT STILL: NOT AT ALL
3. WORRYING TOO MUCH ABOUT DIFFERENT THINGS: SEVERAL DAYS
2. NOT BEING ABLE TO STOP OR CONTROL WORRYING: SEVERAL DAYS
1. FEELING NERVOUS, ANXIOUS, OR ON EDGE: MORE THAN HALF THE DAYS
7. FEELING AFRAID AS IF SOMETHING AWFUL MIGHT HAPPEN: SEVERAL DAYS
7. FEELING AFRAID AS IF SOMETHING AWFUL MIGHT HAPPEN: SEVERAL DAYS
4. TROUBLE RELAXING: SEVERAL DAYS
GAD7 TOTAL SCORE: 6
6. BECOMING EASILY ANNOYED OR IRRITABLE: NOT AT ALL

## 2022-07-06 ENCOUNTER — VIRTUAL VISIT (OUTPATIENT)
Dept: FAMILY MEDICINE | Facility: CLINIC | Age: 62
End: 2022-07-06
Payer: COMMERCIAL

## 2022-07-06 DIAGNOSIS — F41.0 PANIC ATTACK: ICD-10-CM

## 2022-07-06 DIAGNOSIS — F41.1 GAD (GENERALIZED ANXIETY DISORDER): Primary | ICD-10-CM

## 2022-07-06 PROCEDURE — 99214 OFFICE O/P EST MOD 30 MIN: CPT | Mod: 95 | Performed by: FAMILY MEDICINE

## 2022-07-06 PROCEDURE — 96127 BRIEF EMOTIONAL/BEHAV ASSMT: CPT | Mod: GT | Performed by: FAMILY MEDICINE

## 2022-07-06 RX ORDER — LORAZEPAM 0.5 MG/1
0.5 TABLET ORAL DAILY PRN
Qty: 10 TABLET | Refills: 0 | Status: SHIPPED | OUTPATIENT
Start: 2022-07-06 | End: 2023-06-07

## 2022-07-06 ASSESSMENT — ANXIETY QUESTIONNAIRES: GAD7 TOTAL SCORE: 6

## 2022-07-06 ASSESSMENT — PATIENT HEALTH QUESTIONNAIRE - PHQ9: SUM OF ALL RESPONSES TO PHQ QUESTIONS 1-9: 1

## 2022-07-06 NOTE — PROGRESS NOTES
Hali is a 61 year old who is being evaluated via a billable video visit.      How would you like to obtain your AVS? MyChart  If the video visit is dropped, the invitation should be resent by: Send to e-mail at: [email or web address]  Will anyone else be joining your video visit? No        Assessment & Plan     ERIK (generalized anxiety disorder)    Is seeing a therapist.  Working on exercise, nature.  Will add weight lifting.    Will try:    Switch sertraline 25 mg to nighttime x 1 week.    If doing well but anxiety not controlled, increase to 37.5 mg (3/4 tab) nightly    If nighttime does doesn't go well, switch back to AM    Follow up in 4-6 weeks with me to continue to adjust medications as able.  Other options if this doesn't work include:    SNRI rather than SSRI    Getting Genesight testing      Prescription drug management      No follow-ups on file.   Follow-up Visit   Expected date:  Aug 06, 2022 (Approximate)      Follow Up Appointment Details:     Follow-up with whom?: Me    Follow-Up for what?: Chronic Disease f/u    Chronic Disease f/u: Anxiety    How?: Video    Is this an as-needed follow-up?: No                    Yue Malhotra MD  [facility]    Subjective   Hali is a 61 year old, presenting for the following health issues:  Depression      History of Present Illness       Mental Health Follow-up:  Patient presents to follow-up on Anxiety.    Patient's anxiety since last visit has been:  Better  The patient is not having other symptoms associated with anxiety.  Any significant life events: No  Patient is feeling anxious or having panic attacks.  Patient has no concerns about alcohol or drug use.    She eats 4 or more servings of fruits and vegetables daily.She consumes 0 sweetened beverage(s) daily.She exercises with enough effort to increase her heart rate 20 to 29 minutes per day.  She exercises with enough effort to increase her heart rate 5 days per week.   She  is taking medications regularly.  Today's ERIK-7 Score: 6     Additional: pt has been taking half tablet 25mg, had bad side effects of insomnia and anxiety with 50mg. She would like to discuss med dosage.     She also found a therapist who is helping.     61 year old new to me today.  Started sertraline 1 month ago.  After 1 week of taking half a pill went to whole pill, and was horrible and had side effects and insomnia.    In last 2 weeks has felt a lot better.   Still has daily anxiety, but much less freqent and more anxiety.      Now thinking Zoloft is helping.  So question is - should she stay on half?  Or switch to something else?  Tried to increase to 3/4 but didn't feel well - woke up in the night, couldn't fall back asleep.        ERIK-7 SCORE 4/26/2022 7/5/2022   Total Score 10 (moderate anxiety) 6 (mild anxiety)   Total Score 10 6           Last PHQ-9 7/6/2022   1.  Little interest or pleasure in doing things 0   2.  Feeling down, depressed, or hopeless 0   3.  Trouble falling or staying asleep, or sleeping too much 0   4.  Feeling tired or having little energy 0   5.  Poor appetite or overeating 0   6.  Feeling bad about yourself 1   7.  Trouble concentrating 0   8.  Moving slowly or restless 0   Q9: Thoughts of better off dead/self-harm past 2 weeks 0   PHQ-9 Total Score 1   Difficulty at work, home, or with people Not difficult at all     ERIK-7  7/5/2022   1. Feeling nervous, anxious, or on edge 2   2. Not being able to stop or control worrying 1   3. Worrying too much about different things 1   4. Trouble relaxing 1   5. Being so restless that it is hard to sit still 0   6. Becoming easily annoyed or irritable 0   7. Feeling afraid, as if something awful might happen 1   ERIK-7 Total Score 6       Social History     Tobacco Use     Smoking status: Never Smoker     Smokeless tobacco: Never Used   Vaping Use     Vaping Use: Never used   Substance Use Topics     Alcohol use: Yes     Alcohol/week: 3.0  "standard drinks     Drug use: Never     PHQ 7/6/2022   PHQ-9 Total Score 1   Q9: Thoughts of better off dead/self-harm past 2 weeks Not at all     ERIK-7 SCORE 4/26/2022 7/5/2022   Total Score 10 (moderate anxiety) 6 (mild anxiety)   Total Score 10 6         Suicide Assessment Five-step Evaluation and Treatment (SAFE-T)        Review of Systems   Constitutional, HEENT, cardiovascular, pulmonary, gi and gu systems are negative, except as otherwise noted.      Objective    Vitals - Patient Reported  Weight (Patient Reported): 54.4 kg (120 lb)  Height (Patient Reported): 157.5 cm (5' 2\")  BMI (Based on Pt Reported Ht/Wt): 21.95      Vitals:  No vitals were obtained today due to virtual visit.    Physical Exam   GENERAL: Healthy, alert and no distress  EYES: Eyes grossly normal to inspection.  No discharge or erythema, or obvious scleral/conjunctival abnormalities.  RESP: No audible wheeze, cough, or visible cyanosis.  No visible retractions or increased work of breathing.    SKIN: Visible skin clear. No significant rash, abnormal pigmentation or lesions.  NEURO: Cranial nerves grossly intact.  Mentation and speech appropriate for age.  PSYCH: Mentation appears normal, affect normal/bright, judgement and insight intact, normal speech and appearance well-groomed.          Video-Visit Details    Video Start Time: 5:12 PM    Type of service:  Video Visit    Video End Time:5:30 PM    Originating Location (pt. Location): Home    Distant Location (provider location):  [facility]     Platform used for Video Visit: [employer]    .  ..  "

## 2022-07-11 ENCOUNTER — MYC MEDICAL ADVICE (OUTPATIENT)
Dept: FAMILY MEDICINE | Facility: CLINIC | Age: 62
End: 2022-07-11

## 2022-07-11 DIAGNOSIS — F41.1 GAD (GENERALIZED ANXIETY DISORDER): Primary | ICD-10-CM

## 2022-07-11 DIAGNOSIS — F41.0 PANIC ATTACK: ICD-10-CM

## 2022-07-12 NOTE — TELEPHONE ENCOUNTER
Dr Malhotra, please advise    Hi Dr Malhotra, I adjusted the timing of my dose of sertraline to evening, and it has not helped with my sleeplessness and nighttime anxiety.  I also still have flare-ups of anxiety during the day, after six weeks of taking this.  I am discouraged and feel that sertraline is just not working well for me.  I m afraid to increase the dose, as we discussed because I don t want to increase the anxiety.  You mentioned that there is a test that may help find a medication better suited to me.  Would you consider it for me at this point?    My second question is whether there is a natural supplement I might be able to take to help with sleep, such as valerian root or melatonin.  Would this interact with the sertraline?  I started lifting weights to see if it helps :)  Thank you,  Hali

## 2022-07-13 NOTE — TELEPHONE ENCOUNTER
Genesight order mostly completed - some additional data needs to be put on paperwork and it needs to be faxed with [employer]ulsity[employer] card copy.  Will give to N.O.D.    Can you let her know?  And okay to use melatonin or valerian root.    Has follow up scheduled August -    Dr. Yue Malhotra MD / [facility]

## 2022-07-13 NOTE — TELEPHONE ENCOUNTER
[employer] forms completed and faxed to [phone].  Accountable message sent to patient.  GOKUL Mcfarland RN  [facility]

## 2022-07-27 ENCOUNTER — TELEPHONE (OUTPATIENT)
Dept: FAMILY MEDICINE | Facility: CLINIC | Age: 62
End: 2022-07-27

## 2022-07-27 NOTE — TELEPHONE ENCOUNTER
Hali calling returning call to set up visit with MTN, she has a visit 8/16/2022 with you and is wondering if she can just discuss test results at that visit.    Thank you

## 2022-07-28 NOTE — TELEPHONE ENCOUNTER
I called pt and relayed this message, she is grateful for Dr. Malhotra's response.    SNOW ZunigaN RN  [facility]

## 2022-08-15 ASSESSMENT — ANXIETY QUESTIONNAIRES
8. IF YOU CHECKED OFF ANY PROBLEMS, HOW DIFFICULT HAVE THESE MADE IT FOR YOU TO DO YOUR WORK, TAKE CARE OF THINGS AT HOME, OR GET ALONG WITH OTHER PEOPLE?: NOT DIFFICULT AT ALL
IF YOU CHECKED OFF ANY PROBLEMS ON THIS QUESTIONNAIRE, HOW DIFFICULT HAVE THESE PROBLEMS MADE IT FOR YOU TO DO YOUR WORK, TAKE CARE OF THINGS AT HOME, OR GET ALONG WITH OTHER PEOPLE: NOT DIFFICULT AT ALL
GAD7 TOTAL SCORE: 2
3. WORRYING TOO MUCH ABOUT DIFFERENT THINGS: NOT AT ALL
6. BECOMING EASILY ANNOYED OR IRRITABLE: NOT AT ALL
GAD7 TOTAL SCORE: 2
GAD7 TOTAL SCORE: 2
7. FEELING AFRAID AS IF SOMETHING AWFUL MIGHT HAPPEN: NOT AT ALL
1. FEELING NERVOUS, ANXIOUS, OR ON EDGE: SEVERAL DAYS
5. BEING SO RESTLESS THAT IT IS HARD TO SIT STILL: NOT AT ALL
7. FEELING AFRAID AS IF SOMETHING AWFUL MIGHT HAPPEN: NOT AT ALL
4. TROUBLE RELAXING: SEVERAL DAYS
2. NOT BEING ABLE TO STOP OR CONTROL WORRYING: NOT AT ALL

## 2022-08-16 ENCOUNTER — VIRTUAL VISIT (OUTPATIENT)
Dept: FAMILY MEDICINE | Facility: CLINIC | Age: 62
End: 2022-08-16
Attending: FAMILY MEDICINE
Payer: COMMERCIAL

## 2022-08-16 DIAGNOSIS — F41.1 GAD (GENERALIZED ANXIETY DISORDER): Primary | ICD-10-CM

## 2022-08-16 DIAGNOSIS — Z13.79 ENCOUNTER FOR GENETIC TESTING OF FEMALE: ICD-10-CM

## 2022-08-16 PROCEDURE — 99214 OFFICE O/P EST MOD 30 MIN: CPT | Mod: 95 | Performed by: FAMILY MEDICINE

## 2022-08-16 ASSESSMENT — ANXIETY QUESTIONNAIRES: GAD7 TOTAL SCORE: 2

## 2022-08-16 NOTE — PROGRESS NOTES
Hali is a 61 year old who is being evaluated via a billable video visit.      How would you like to obtain your AVS? MyChart  If the video visit is dropped, the invitation should be resent by: Text to cell phone: [phone]  Will anyone else be joining your video visit? No        Assessment & Plan     Hali was seen today for recheck medication.    Diagnoses and all orders for this visit:    ERIK (generalized anxiety disorder)  Comments:    Improved now w/ 25 mg sertraline and 5 mg melatonin nightly, and therapy    Trial slow increase to 37.5 mg    If doesn't go well, okay to go back to 25 mg    F/U 1 yr; sooner if worsening or any issues or problems  Orders:  -     Discontinue: sertraline (ZOLOFT) 50 MG tablet; Take 1 tablet (50 mg) by mouth daily  -     sertraline (ZOLOFT) 50 MG tablet; Take 1 tablet (50 mg) by mouth daily    Encounter for genetic testing of female  Comments:    Reviewed Genesight testing    Discussed that sertraline has no genetic interactions, we can use as directed    Discussed that does have some gene interactions - if in future we prescribe medicine for sleep or anxiety, ask future doctors to look at results of genesight testing before prescribing.  Other orders  -     PRIMARY CARE FOLLOW-UP SCHEDULING          Prescription drug management      No follow-ups on file.   Follow-up Visit   Expected date:  Jun 02, 2023 (Approximate)      Follow Up Appointment Details:     Follow-up with whom?: Me    Follow-Up for what?: Adult Preventive    How?: In Person                    Yue Malhotra MD  [facility]   Hali is a 61 year old, presenting for the following health issues:  Recheck Medication      History of Present Illness       Mental Health Follow-up:  Patient presents to follow-up on Anxiety.    Patient's anxiety since last visit has been:  Good  The patient is not having other symptoms associated with anxiety.  Any significant life events:  "No  Patient is feeling anxious or having panic attacks.  Patient has no concerns about alcohol or drug use.   Today's ERIK-7 Score: 2     61 year old here to follow up on anxiety.  Since last visit:  Sertraline seems to be working so much better now.  Contacted me 2 weeks after visit - was having a hard time sleeping.  Right after that it clicked.  Tried melatonin at night; it really helped.  And then general anxiety improved.    Still has bouts of anxiety.  Much more manageable.    Still only takes 25 mg at bedtime of sertraline  And 5 mg of melatonin    Now:  Feels so much better than did one month ago.  Would like to continue forward to see if gets even better.  Has been seeing a therapist.      Review of Systems   Constitutional, HEENT, cardiovascular, pulmonary, gi and gu systems are negative, except as otherwise noted.      Objective    Vitals - Patient Reported  Weight (Patient Reported): 54.4 kg (120 lb)  Height (Patient Reported): 157.5 cm (5' 2\")  BMI (Based on Pt Reported Ht/Wt): 21.95      Vitals:  No vitals were obtained today due to virtual visit.    Physical Exam   GENERAL: Healthy, alert and no distress  EYES: Eyes grossly normal to inspection.  No discharge or erythema, or obvious scleral/conjunctival abnormalities.  RESP: No audible wheeze, cough, or visible cyanosis.  No visible retractions or increased work of breathing.    SKIN: Visible skin clear. No significant rash, abnormal pigmentation or lesions.  NEURO: Cranial nerves grossly intact.  Mentation and speech appropriate for age.  PSYCH: Mentation appears normal, affect normal/bright, judgement and insight intact, normal speech and appearance well-groomed.          Video-Visit Details    Video Start Time: 8:03 AM    Type of service:  Video Visit    Video End Time:8:25 AM    Originating Location (pt. Location): Home    Distant Location (provider location):  [facility]     Platform used for Video Visit: " [facility]    .  ..

## 2022-09-18 ENCOUNTER — HEALTH MAINTENANCE LETTER (OUTPATIENT)
Age: 62
End: 2022-09-18

## 2022-12-08 ENCOUNTER — OFFICE VISIT (OUTPATIENT)
Dept: AUDIOLOGY | Facility: CLINIC | Age: 62
End: 2022-12-08
Attending: NURSE PRACTITIONER
Payer: COMMERCIAL

## 2022-12-08 DIAGNOSIS — H91.93 BILATERAL HEARING LOSS, UNSPECIFIED HEARING LOSS TYPE: Primary | ICD-10-CM

## 2022-12-08 PROCEDURE — 92557 COMPREHENSIVE HEARING TEST: CPT | Performed by: AUDIOLOGIST

## 2022-12-08 PROCEDURE — 92550 TYMPANOMETRY & REFLEX THRESH: CPT | Performed by: AUDIOLOGIST

## 2022-12-08 NOTE — PROGRESS NOTES
AUDIOLOGY REPORT    SUBJECTIVE:  Hali Hebert is a 62 year old female who was seen in the Audiology Clinic at the [facility] for audiologic evaluation, referred by Charo Lorenzo C.N.P. The patient has been seen previously in this clinic on 10-26-16 for assessment and results indicated normal hearing sensitivity, bilaterally, with mild, possibly sensorineural hearing loss affecting 8000 Hz only in each ear. The patient reports that both of her parents have significant hearing loss and waited to get hearing aids; adjustment was hard for them, and patient wishes to be more proactive if she begins to have hearing loss. She questions whether she hears family members well, or if they are mumbling. 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 She denied tinnitus, dizziness, otalgia, otorrhea, recent illness, or aural fullness.     OBJECTIVE:  Abuse Screening:  Do you feel unsafe at home or work/school? No  Do you feel threatened by someone? No  Does anyone try to keep you from having contact with others, or doing things outside of your home? No  Physical signs of abuse present? No     Fall Risk Screen:  1. Have you fallen two or more times in the past year? No  2. Have you fallen and had an injury in the past year? No    Timed Up and Go Score (in seconds): not tested  Is patient a fall risk? No  Referral initiated: No  Fall Risk Assessment Completed by Audiology    Otoscopic exam indicates ears are clear of cerumen bilaterally.     Pure Tone Thresholds assessed using conventional audiometry with good  reliability from 250-8000 Hz bilaterally using insert earphones and circumaural headphones.     RIGHT:  normal hearing sensitivity for 250-6000 Hz, sloping to moderate, likely sensorineural hearing loss at 8000 Hz only.    LEFT:    normal hearing sensitivity for 250-6000 Hz, sloping to mild, likely sensorineural hearing loss at 8000 Hz only.    Tympanogram:     RIGHT: normal eardrum mobility    LEFT:   Normal/hypermobile eardrum mobility    Reflexes for 1000 Hz (reported by stimulus ear):  RIGHT: Ipsilateral is present at normal levels  RIGHT: Contralateral is present at normal levels  LEFT:   Ipsilateral is present at normal levels  LEFT:   Contralateral is present at normal levels      Speech Reception Threshold:    RIGHT: 10 dB HL    LEFT:   10 dB HL  Word Recognition Score:     RIGHT: 100% at 50 dB HL using NU-6 recorded word list.    LEFT:   100% at 50 dB HL using NU-6 recorded word list.      ASSESSMENT:     ICD-10-CM    1. Bilateral hearing loss, unspecified hearing loss type  H91.93 Adult Audiology  Referral          Compared to patient's previous audiogram dated 10-26-16, hearing thresholds have shifted 5-15 dB at 8000 Hz only, but were otherwise stable in each ear. Today s results were discussed with the patient in detail.     PLAN:  Patient was counseled regarding hearing loss and impact on communication.  Patient is not yet an ideal candidate for amplification at this time. Appropriate communication strategies were discussed at length, as were reasonable expectations regarding hearing at a distance, in noisy environments, or when attention is diverted elsewhere.    Ms. Hebert should return annually for retesting to monitor current hearing thresholds. Wear hearing protection consistently in noise to preserve current hearing sensitivity and to minimize the effects of tinnitus. Ms. Hebert expressed verbal understanding of this information and plan. Please call this clinic with questions regarding these results or recommendations.        Luana Negrete, [facility]  [place] Licensed Audiologist [id number]

## 2023-02-22 DIAGNOSIS — F41.1 GAD (GENERALIZED ANXIETY DISORDER): ICD-10-CM

## 2023-02-22 DIAGNOSIS — F41.0 PANIC ATTACK: ICD-10-CM

## 2023-02-22 RX ORDER — LORAZEPAM 0.5 MG/1
TABLET ORAL
Qty: 10 TABLET | OUTPATIENT
Start: 2023-02-22

## 2023-02-23 DIAGNOSIS — F41.1 GAD (GENERALIZED ANXIETY DISORDER): ICD-10-CM

## 2023-02-27 NOTE — TELEPHONE ENCOUNTER
Too early.         Disp Refills Start End JCARLOS   sertraline (ZOLOFT) 50 MG tablet 90 tablet 3 8/16/2022  No   Sig - Route: Take 1 tablet (50 mg) by mouth daily

## 2023-06-04 ASSESSMENT — ENCOUNTER SYMPTOMS
ABDOMINAL PAIN: 0
CONSTIPATION: 0
FEVER: 0
NERVOUS/ANXIOUS: 0
HEMATURIA: 0
PARESTHESIAS: 0
HEADACHES: 0
CHILLS: 0
JOINT SWELLING: 0
ARTHRALGIAS: 0
DIARRHEA: 0
EYE PAIN: 0
DIZZINESS: 0
MYALGIAS: 0
BREAST MASS: 0
WEAKNESS: 0
SHORTNESS OF BREATH: 0
PALPITATIONS: 0
NAUSEA: 0
COUGH: 0
SORE THROAT: 0
HEMATOCHEZIA: 0
DYSURIA: 0
HEARTBURN: 0
FREQUENCY: 0

## 2023-06-07 ENCOUNTER — OFFICE VISIT (OUTPATIENT)
Dept: FAMILY MEDICINE | Facility: CLINIC | Age: 63
End: 2023-06-07
Attending: FAMILY MEDICINE
Payer: COMMERCIAL

## 2023-06-07 VITALS
OXYGEN SATURATION: 98 % | DIASTOLIC BLOOD PRESSURE: 72 MMHG | RESPIRATION RATE: 14 BRPM | BODY MASS INDEX: 23.55 KG/M2 | TEMPERATURE: 99.3 F | HEART RATE: 77 BPM | WEIGHT: 128 LBS | HEIGHT: 62 IN | SYSTOLIC BLOOD PRESSURE: 128 MMHG

## 2023-06-07 DIAGNOSIS — Z00.00 ROUTINE GENERAL MEDICAL EXAMINATION AT A HEALTH CARE FACILITY: Primary | ICD-10-CM

## 2023-06-07 DIAGNOSIS — F41.0 PANIC ATTACK: ICD-10-CM

## 2023-06-07 DIAGNOSIS — Z12.31 VISIT FOR SCREENING MAMMOGRAM: ICD-10-CM

## 2023-06-07 DIAGNOSIS — E78.2 MIXED HYPERLIPIDEMIA: ICD-10-CM

## 2023-06-07 DIAGNOSIS — F41.1 GAD (GENERALIZED ANXIETY DISORDER): ICD-10-CM

## 2023-06-07 DIAGNOSIS — Z80.3 FAMILY HISTORY OF BREAST CANCER IN MOTHER: ICD-10-CM

## 2023-06-07 PROCEDURE — 90715 TDAP VACCINE 7 YRS/> IM: CPT | Performed by: FAMILY MEDICINE

## 2023-06-07 PROCEDURE — 90471 IMMUNIZATION ADMIN: CPT | Performed by: FAMILY MEDICINE

## 2023-06-07 PROCEDURE — 99214 OFFICE O/P EST MOD 30 MIN: CPT | Mod: 25 | Performed by: FAMILY MEDICINE

## 2023-06-07 PROCEDURE — 99396 PREV VISIT EST AGE 40-64: CPT | Mod: 25 | Performed by: FAMILY MEDICINE

## 2023-06-07 RX ORDER — LORAZEPAM 0.5 MG/1
0.5 TABLET ORAL DAILY PRN
Qty: 10 TABLET | Refills: 0 | Status: SHIPPED | OUTPATIENT
Start: 2023-06-07 | End: 2024-06-07

## 2023-06-07 ASSESSMENT — ENCOUNTER SYMPTOMS
HEADACHES: 0
HEARTBURN: 0
DIARRHEA: 0
SORE THROAT: 0
DIZZINESS: 0
DYSURIA: 0
PARESTHESIAS: 0
MYALGIAS: 0
FREQUENCY: 0
CONSTIPATION: 0
HEMATOCHEZIA: 0
WEAKNESS: 0
SHORTNESS OF BREATH: 0
BREAST MASS: 0
COUGH: 0
ABDOMINAL PAIN: 0
CHILLS: 0
HEMATURIA: 0
PALPITATIONS: 0
NAUSEA: 0
JOINT SWELLING: 0
NERVOUS/ANXIOUS: 0
FEVER: 0
EYE PAIN: 0
ARTHRALGIAS: 0

## 2023-06-07 ASSESSMENT — PAIN SCALES - GENERAL: PAINLEVEL: NO PAIN (0)

## 2023-06-07 NOTE — PROGRESS NOTES
SUBJECTIVE:   CC: Hali is an 62 year old who presents for preventive health visit.       6/7/2023     3:00 PM   Additional Questions   Roomed by Wendi RAMIREZ         6/7/2023     3:00 PM   Patient Reported Additional Medications   Patient reports taking the following new medications Lorazepam- pt would like refill     Healthy Habits:     Getting at least 3 servings of Calcium per day:  NO    Bi-annual eye exam:  Yes    Dental care twice a year:  Yes    Sleep apnea or symptoms of sleep apnea:  None    Diet:  Regular (no restrictions)    Frequency of exercise:  6-7 days/week    Duration of exercise:  30-45 minutes    Taking medications regularly:  Yes    Medication side effects:  None    PHQ-2 Total Score: 0    Additional concerns today:  No    ERIK:  Sertraline has really helped  Started last year.  By Sept - now taking a whole one.        4/26/2022    12:17 PM 7/5/2022     3:19 PM 8/15/2022    10:38 AM   ERIK-7 SCORE   Total Score 10 (moderate anxiety) 6 (mild anxiety) 2 (minimal anxiety)   Total Score 10 6 2     Gained back weight that lost.  Sleeping through the night most nights    Also - has half a tab of lorazepam left for emergencies.    The 10-year ASCVD risk score (Leena DK, et al., 2019) is: 3.4%    Values used to calculate the score:      Age: 62 years      Sex: Female      Is Non- : No      Diabetic: No      Tobacco smoker: No      Systolic Blood Pressure: 128 mmHg      Is BP treated: No      HDL Cholesterol: 82 mg/dL      Total Cholesterol: 293 mg/dL        Today's PHQ-2 Score:       6/7/2023     2:50 PM   PHQ-2 ( 1999 [employer])   Q1: Little interest or pleasure in doing things 0   Q2: Feeling down, depressed or hopeless 0   PHQ-2 Score 0   Q1: Little interest or pleasure in doing things Not at all   Q2: Feeling down, depressed or hopeless Not at all   PHQ-2 Score 0           Social History     Tobacco Use     Smoking status: Former     Packs/day: 0.00     Years: 4.00     Pack years:  0.00     Types: Cigarettes     Start date: 1980     Quit date: 1984     Years since quittin.4     Smokeless tobacco: Never     Tobacco comments:     during college   Vaping Use     Vaping status: Never Used   Substance Use Topics     Alcohol use: Yes     Alcohol/week: 3.0 standard drinks of alcohol     Comment: occasional glass of wine or beer with a meal             2023     8:09 PM   Alcohol Use   Prescreen: >3 drinks/day or >7 drinks/week? No          View : No data to display.              Reviewed orders with patient.  Reviewed health maintenance and updated orders accordingly - Yes      Breast Cancer Screening:    FHS-7:       2022     1:29 PM 2023     8:10 PM   Breast CA Risk Assessment (FHS-7)   Did any of your first-degree relatives have breast or ovarian cancer? Yes Yes   Did any of your relatives have bilateral breast cancer? No No   Did any man in your family have breast cancer? No No   Did any woman in your family have breast and ovarian cancer? Yes Yes   Did any woman in your family have breast cancer before age 50 y? No No   Do you have 2 or more relatives with breast and/or ovarian cancer? No No   Do you have 2 or more relatives with breast and/or bowel cancer? No No       Mammogram Screening: Recommended annual mammography  Pertinent mammograms are reviewed under the imaging tab.    History of abnormal Pap smear: Status post benign hysterectomy. Health Maintenance and Surgical History updated.     Reviewed and updated as needed this visit by clinical staff   Tobacco  Allergies  Meds  Problems  Med Hx  Surg Hx  Fam Hx          Reviewed and updated as needed this visit by Provider   Tobacco  Allergies  Meds  Problems  Med Hx  Surg Hx  Fam Hx             Review of Systems   Constitutional: Negative for chills and fever.   HENT: Negative for congestion, ear pain, hearing loss and sore throat.    Eyes: Negative for pain and visual disturbance.   Respiratory:  "Negative for cough and shortness of breath.    Cardiovascular: Negative for chest pain, palpitations and peripheral edema.   Gastrointestinal: Negative for abdominal pain, constipation, diarrhea, heartburn, hematochezia and nausea.   Breasts:  Negative for tenderness, breast mass and discharge.   Genitourinary: Negative for dysuria, frequency, genital sores, hematuria, pelvic pain, urgency, vaginal bleeding and vaginal discharge.   Musculoskeletal: Negative for arthralgias, joint swelling and myalgias.   Skin: Negative for rash.   Neurological: Negative for dizziness, weakness, headaches and paresthesias.   Psychiatric/Behavioral: Negative for mood changes. The patient is not nervous/anxious.           OBJECTIVE:   /72   Pulse 77   Temp 99.3  F (37.4  C) (Temporal)   Resp 14   Ht 1.581 m (5' 2.25\")   Wt 58.1 kg (128 lb)   SpO2 98%   Breastfeeding No   BMI 23.22 kg/m    Physical Exam  GENERAL: healthy, alert and no distress  EYES: Eyes grossly normal to inspection, PERRL and conjunctivae and sclerae normal  HENT: ear canals and TM's normal, nose and mouth without ulcers or lesions  NECK: no adenopathy, no asymmetry, masses, or scars and thyroid normal to palpation  RESP: lungs clear to auscultation - no rales, rhonchi or wheezes  BREAST: normal without masses, tenderness or nipple discharge and no palpable axillary masses or adenopathy  CV: regular rate and rhythm, normal S1 S2, no S3 or S4, no murmur, click or rub, no peripheral edema and peripheral pulses strong  ABDOMEN: soft, nontender, no hepatosplenomegaly, no masses and bowel sounds normal  MS: no gross musculoskeletal defects noted, no edema  SKIN: no suspicious lesions or rashes  NEURO: Normal strength and tone, mentation intact and speech normal  PSYCH: mentation appears normal, affect normal/bright    Diagnostic Test Results:  Labs reviewed in Epic    The 10-year ASCVD risk score (Leena DK, et al., 2019) is: 3.4%    Values used to calculate " the score:      Age: 62 years      Sex: Female      Is Non- : No      Diabetic: No      Tobacco smoker: No      Systolic Blood Pressure: 128 mmHg      Is BP treated: No      HDL Cholesterol: 82 mg/dL      Total Cholesterol: 293 mg/dL      ASSESSMENT/PLAN:   Hali was seen today for physical.    Diagnoses and all orders for this visit:    Routine general medical examination at a health care facility  -     PRIMARY CARE FOLLOW-UP SCHEDULING  -     TDAP VACCINE (Adacel, Boostrix)  -     REVIEW OF HEALTH MAINTENANCE PROTOCOL ORDERS  -     MA Screening Bilateral w/ Samson; Future    Panic attack  and   ERIK (generalized anxiety disorder)  Comments:  Significant improvement with sertraline.  Now on 50 mg dose  Rare lorazepam use - #10 in 1 year.  Refilled &  reviewed - no concerns  Continue exercise, mindfulness  Follow up if worsens  Orders:  -     sertraline (ZOLOFT) 50 MG tablet; Take 1 tablet (50 mg) by mouth daily  -     LORazepam (ATIVAN) 0.5 MG tablet; Take 1 tablet (0.5 mg) by mouth daily as needed for anxiety    Mixed hyperlipidemia  Comments:  Likely famililal as has always been high LDL and HDL despite diet and exercise  ASCVD score 3.7%  Discussed calcium coronary CT - she's considering  Orders:  -     Cancel: Lipid panel reflex to direct LDL Fasting; Future  -     Lipid panel reflex to direct LDL Fasting; Future    Visit for screening mammogram  -     MA Screening Bilateral w/ Samson; Future        Patient has been advised of split billing requirements and indicates understanding: Yes      COUNSELING:  Reviewed preventive health counseling, as reflected in patient instructions        She reports that she quit smoking about 39 years ago. Her smoking use included cigarettes. She started smoking about 43 years ago. She has never used smokeless tobacco.      Yue Malhotra MD  [facility]

## 2023-06-08 PROBLEM — Z80.3 FAMILY HISTORY OF BREAST CANCER IN MOTHER: Status: ACTIVE | Noted: 2023-06-08

## 2023-06-12 ENCOUNTER — LAB (OUTPATIENT)
Dept: LAB | Facility: CLINIC | Age: 63
End: 2023-06-12
Payer: COMMERCIAL

## 2023-06-12 DIAGNOSIS — E78.2 MIXED HYPERLIPIDEMIA: ICD-10-CM

## 2023-06-12 LAB
CHOLEST SERPL-MCNC: 258 MG/DL
HDLC SERPL-MCNC: 74 MG/DL
LDLC SERPL CALC-MCNC: 168 MG/DL
NONHDLC SERPL-MCNC: 184 MG/DL
TRIGL SERPL-MCNC: 82 MG/DL

## 2023-06-12 PROCEDURE — 36415 COLL VENOUS BLD VENIPUNCTURE: CPT

## 2023-06-12 PROCEDURE — 80061 LIPID PANEL: CPT

## 2023-07-13 ENCOUNTER — HOSPITAL ENCOUNTER (OUTPATIENT)
Dept: MAMMOGRAPHY | Facility: CLINIC | Age: 63
Discharge: HOME | End: 2023-07-13
Attending: FAMILY MEDICINE | Admitting: FAMILY MEDICINE
Payer: COMMERCIAL

## 2023-07-13 DIAGNOSIS — Z00.00 ROUTINE GENERAL MEDICAL EXAMINATION AT A HEALTH CARE FACILITY: ICD-10-CM

## 2023-07-13 DIAGNOSIS — Z12.31 VISIT FOR SCREENING MAMMOGRAM: ICD-10-CM

## 2023-07-13 PROCEDURE — 77067 SCR MAMMO BI INCL CAD: CPT

## 2023-09-19 ENCOUNTER — IMMUNIZATION (OUTPATIENT)
Dept: FAMILY MEDICINE | Facility: CLINIC | Age: 63
End: 2023-09-19
Payer: COMMERCIAL

## 2023-09-19 DIAGNOSIS — Z23 NEED FOR PROPHYLACTIC VACCINATION AND INOCULATION AGAINST INFLUENZA: Primary | ICD-10-CM

## 2023-09-19 PROCEDURE — 90682 RIV4 VACC RECOMBINANT DNA IM: CPT

## 2023-09-19 PROCEDURE — 99207 PR NO CHARGE NURSE ONLY: CPT

## 2023-09-19 PROCEDURE — 90471 IMMUNIZATION ADMIN: CPT

## 2023-11-16 ENCOUNTER — OFFICE VISIT (OUTPATIENT)
Dept: URGENT CARE | Facility: URGENT CARE | Age: 63
End: 2023-11-16
Payer: COMMERCIAL

## 2023-11-16 ENCOUNTER — E-VISIT (OUTPATIENT)
Dept: FAMILY MEDICINE | Facility: CLINIC | Age: 63
End: 2023-11-16
Payer: COMMERCIAL

## 2023-11-16 VITALS
BODY MASS INDEX: 23 KG/M2 | SYSTOLIC BLOOD PRESSURE: 128 MMHG | HEIGHT: 62 IN | DIASTOLIC BLOOD PRESSURE: 80 MMHG | HEART RATE: 90 BPM | TEMPERATURE: 98.2 F | WEIGHT: 125 LBS | OXYGEN SATURATION: 97 %

## 2023-11-16 DIAGNOSIS — R50.9 FEVER, UNSPECIFIED FEVER CAUSE: Primary | ICD-10-CM

## 2023-11-16 DIAGNOSIS — J06.9 VIRAL UPPER RESPIRATORY TRACT INFECTION WITH COUGH: Primary | ICD-10-CM

## 2023-11-16 PROCEDURE — 99207 PR NON-BILLABLE SERV PER CHARTING: CPT | Performed by: FAMILY MEDICINE

## 2023-11-16 PROCEDURE — 99213 OFFICE O/P EST LOW 20 MIN: CPT | Performed by: PHYSICIAN ASSISTANT

## 2023-11-16 NOTE — PATIENT INSTRUCTIONS
Patient was educated on the natural course of viral condition. Declined rx cough suppressants. Conservative measures discussed including rest, increased fluids, humidifier, and over-the-counter cough suppressants. See your primary care provider if symptoms do not improve in 7 days. Seek emergency care if you develop shortness of breath or fever over 103.

## 2023-11-16 NOTE — PATIENT INSTRUCTIONS
Dear Hali Hebert,    We are sorry you are not feeling well. Based on the responses you provided, it is recommended that you be seen in-person in urgent care so we can better evaluate your symptoms and likely get a chest xray. Please click here to find the nearest urgent care location to you.   You will not be charged for this Visit. Thank you for trusting us with your care.    Yue Malhotra MD

## 2023-11-16 NOTE — PROGRESS NOTES
URGENT CARE VISIT:    SUBJECTIVE:   Hali Hebert is a 63 year old female presenting with a chief complaint of cough - productive.  Onset was 1 week(s) ago.   She denies the following symptoms: fever, stuffy nose, shortness of breath, vomiting, and diarrhea  Course of illness is same.  Worse at night.   Treatment measures tried include OTC Cough med with some relief of symptoms.  Predisposing factors include None.    PMH:   Past Medical History:   Diagnosis Date    Breast cyst     Cancer (H)     multiple basal cell skin    H/O hemorrhoidectomy 2014    Hx of skin cancer, basal cell 2012    S/P hysterectomy 2009     Allergies: Keflex  [cephalexin] and Lexapro [escitalopram]   Medications:   Current Outpatient Medications   Medication Sig Dispense Refill    niacinamide 500 MG tablet       sertraline (ZOLOFT) 50 MG tablet Take 1 tablet (50 mg) by mouth daily 90 tablet 3    fluticasone furoate 27.5 MCG/SPRAY nasal spray       LORazepam (ATIVAN) 0.5 MG tablet Take 1 tablet (0.5 mg) by mouth daily as needed for anxiety 10 tablet 0    melatonin 5 MG CAPS       multivitamin therapeutic (THERAGRAN) tablet [MULTIVITAMIN THERAPEUTIC (THERAGRAN) TABLET] Take 1 tablet by mouth daily.      tretinoin (RETIN-A) 0.1 % external cream APPLY A PEA SIZE AMOUNT TO ENTIRE FACE EVERY OTHER NIGHT INCREASING TO NIGHTLY AS TOLERATED.MOISTURIZE AFTER.       Social History:   Social History     Tobacco Use    Smoking status: Former     Packs/day: 0.00     Years: 4.00     Additional pack years: 0.00     Total pack years: 0.00     Types: Cigarettes     Start date: 1980     Quit date: 1984     Years since quittin.9    Smokeless tobacco: Never    Tobacco comments:     during college   Substance Use Topics    Alcohol use: Yes     Alcohol/week: 3.0 standard drinks of alcohol     Comment: occasional glass of wine or beer with a meal       ROS:  Review of systems negative except as stated above.    OBJECTIVE:  /80  "  Pulse 90   Temp 98.2  F (36.8  C) (Temporal)   Ht 1.581 m (5' 2.25\")   Wt 56.7 kg (125 lb)   SpO2 97%   BMI 22.68 kg/m    GENERAL APPEARANCE: healthy, alert and no distress  EYES: EOMI,  PERRL, conjunctiva clear  HENT: ear canals and TM's normal.  Nose and mouth without ulcers, erythema or lesions  NECK: supple, nontender, no lymphadenopathy  RESP: lungs clear to auscultation - no rales, rhonchi or wheezes  CV: regular rates and rhythm, normal S1 S2, no murmur noted  SKIN: no suspicious lesions or rashes      ASSESSMENT:    ICD-10-CM    1. Viral upper respiratory tract infection with cough  J06.9           PLAN:  Patient Instructions   Patient was educated on the natural course of viral condition. Declined rx cough suppressants. Conservative measures discussed including rest, increased fluids, humidifier, and over-the-counter cough suppressants. See your primary care provider if symptoms do not improve in 7 days. Seek emergency care if you develop shortness of breath or fever over 103.    Patient verbalized understanding and is agreeable to plan. The patient was discharged ambulatory and in stable condition.    Vianey Gonzalez PA-C ....................  11/16/2023   4:40 PM   "

## 2023-11-21 ENCOUNTER — ANCILLARY PROCEDURE (OUTPATIENT)
Dept: GENERAL RADIOLOGY | Facility: CLINIC | Age: 63
End: 2023-11-21
Attending: FAMILY MEDICINE
Payer: COMMERCIAL

## 2023-11-21 ENCOUNTER — OFFICE VISIT (OUTPATIENT)
Dept: URGENT CARE | Facility: URGENT CARE | Age: 63
End: 2023-11-21
Payer: COMMERCIAL

## 2023-11-21 VITALS
TEMPERATURE: 100.2 F | OXYGEN SATURATION: 98 % | DIASTOLIC BLOOD PRESSURE: 70 MMHG | BODY MASS INDEX: 23 KG/M2 | HEIGHT: 62 IN | HEART RATE: 86 BPM | WEIGHT: 125 LBS | SYSTOLIC BLOOD PRESSURE: 122 MMHG

## 2023-11-21 DIAGNOSIS — J18.9 PNEUMONIA OF RIGHT LOWER LOBE DUE TO INFECTIOUS ORGANISM: ICD-10-CM

## 2023-11-21 DIAGNOSIS — R05.3 PERSISTENT COUGH: ICD-10-CM

## 2023-11-21 DIAGNOSIS — R50.9 FEVER IN ADULT: Primary | ICD-10-CM

## 2023-11-21 LAB
BASOPHILS # BLD AUTO: 0 10E3/UL (ref 0–0.2)
BASOPHILS NFR BLD AUTO: 1 %
EOSINOPHIL # BLD AUTO: 0.1 10E3/UL (ref 0–0.7)
EOSINOPHIL NFR BLD AUTO: 2 %
ERYTHROCYTE [DISTWIDTH] IN BLOOD BY AUTOMATED COUNT: 12.3 % (ref 10–15)
HCT VFR BLD AUTO: 36.1 % (ref 35–47)
HGB BLD-MCNC: 12 G/DL (ref 11.7–15.7)
IMM GRANULOCYTES # BLD: 0 10E3/UL
IMM GRANULOCYTES NFR BLD: 0 %
LYMPHOCYTES # BLD AUTO: 0.7 10E3/UL (ref 0.8–5.3)
LYMPHOCYTES NFR BLD AUTO: 8 %
MCH RBC QN AUTO: 30.7 PG (ref 26.5–33)
MCHC RBC AUTO-ENTMCNC: 33.2 G/DL (ref 31.5–36.5)
MCV RBC AUTO: 92 FL (ref 78–100)
MONOCYTES # BLD AUTO: 0.7 10E3/UL (ref 0–1.3)
MONOCYTES NFR BLD AUTO: 8 %
NEUTROPHILS # BLD AUTO: 7.1 10E3/UL (ref 1.6–8.3)
NEUTROPHILS NFR BLD AUTO: 81 %
PLATELET # BLD AUTO: 288 10E3/UL (ref 150–450)
RBC # BLD AUTO: 3.91 10E6/UL (ref 3.8–5.2)
WBC # BLD AUTO: 8.8 10E3/UL (ref 4–11)

## 2023-11-21 PROCEDURE — 71046 X-RAY EXAM CHEST 2 VIEWS: CPT | Mod: TC | Performed by: RADIOLOGY

## 2023-11-21 PROCEDURE — 36415 COLL VENOUS BLD VENIPUNCTURE: CPT | Performed by: FAMILY MEDICINE

## 2023-11-21 PROCEDURE — 85025 COMPLETE CBC W/AUTO DIFF WBC: CPT | Performed by: FAMILY MEDICINE

## 2023-11-21 PROCEDURE — 99214 OFFICE O/P EST MOD 30 MIN: CPT | Performed by: FAMILY MEDICINE

## 2023-11-21 RX ORDER — CEFDINIR 300 MG/1
300 CAPSULE ORAL 2 TIMES DAILY
Status: CANCELLED | OUTPATIENT
Start: 2023-11-21

## 2023-11-21 RX ORDER — CEFDINIR 300 MG/1
300 CAPSULE ORAL 2 TIMES DAILY
Qty: 14 CAPSULE | Refills: 0 | Status: SHIPPED | OUTPATIENT
Start: 2023-11-21 | End: 2023-11-28

## 2023-11-21 RX ORDER — BENZONATATE 100 MG/1
100 CAPSULE ORAL 3 TIMES DAILY PRN
Qty: 30 CAPSULE | Refills: 0 | Status: SHIPPED | OUTPATIENT
Start: 2023-11-21 | End: 2024-06-07

## 2023-11-21 RX ORDER — AZITHROMYCIN 250 MG/1
TABLET, FILM COATED ORAL
Qty: 6 TABLET | Refills: 0 | Status: SHIPPED | OUTPATIENT
Start: 2023-11-21 | End: 2023-11-26

## 2023-11-21 NOTE — PROGRESS NOTES
Chief Complaint   Patient presents with    Urgent Care    Fever     X12 days of fever, chills     Cough     X12 days of cough, hard time sleeping, tightness in chest        Hali was seen today for urgent care, fever and cough.    Diagnoses and all orders for this visit:    Fever in adult  -     CBC with platelets and differential; Future  -     CBC with platelets and differential    Persistent cough  -     XR Chest 2 Views  -     benzonatate (TESSALON) 100 MG capsule; Take 1 capsule (100 mg) by mouth 3 times daily as needed    Pneumonia of right lower lobe due to infectious organism  -     azithromycin (ZITHROMAX) 250 MG tablet; Take 2 tablets (500 mg) by mouth daily for 1 day, THEN 1 tablet (250 mg) daily for 4 days.  -     cefdinir (OMNICEF) 300 MG capsule; Take 1 capsule (300 mg) by mouth 2 times daily for 7 days      D/d  Acute Bronchitis  Acute Sinusitis  Pneumonia  Post-Nasal Drip  RSV  Upper Respiratory Infection    PLAN:  See orders in Epic  Symptomatic measures encouraged, humidified air, plenty of fluids.  X-ray did show right lower lobe pneumonia discussed with patient about the finding we will treat with 2 antibiotic follow-up with primary in couple of days for recheck of lungs symptoms do not get better in the next 2 to 3 days should follow-up  Reviewed CBC result and x-ray result with patient.  He did have history of diarrhea with Keflex but that was many years back patient has tried amoxicillin in the past with no problem would consider treating with Omnicef along with azithromycin to help with the symptoms of pneumonia  SUBJECTIVE:  Hali Hebert is a 63 year old female who presents to the clinic today with a chief complaint of cough and fever for 12 day(s).  Her cough is described as persistent and dry  The patient's symptoms are moderate and stable.  Associated symptoms include fever. The patient's symptoms are exacerbated by no particular triggers  Patient has been using OTC cough suppressants  to  "improve symptoms.    Past Medical History:   Diagnosis Date    Breast cyst     Cancer (H)     multiple basal cell skin    H/O hemorrhoidectomy 2014    Hx of skin cancer, basal cell 2012    S/P hysterectomy 2009       Current Outpatient Medications   Medication Sig Dispense Refill    azithromycin (ZITHROMAX) 250 MG tablet Take 2 tablets (500 mg) by mouth daily for 1 day, THEN 1 tablet (250 mg) daily for 4 days. 6 tablet 0    benzonatate (TESSALON) 100 MG capsule Take 1 capsule (100 mg) by mouth 3 times daily as needed 30 capsule 0    cefdinir (OMNICEF) 300 MG capsule Take 1 capsule (300 mg) by mouth 2 times daily for 7 days 14 capsule 0    fluticasone furoate 27.5 MCG/SPRAY nasal spray       LORazepam (ATIVAN) 0.5 MG tablet Take 1 tablet (0.5 mg) by mouth daily as needed for anxiety 10 tablet 0    melatonin 5 MG CAPS       multivitamin therapeutic (THERAGRAN) tablet [MULTIVITAMIN THERAPEUTIC (THERAGRAN) TABLET] Take 1 tablet by mouth daily.      niacinamide 500 MG tablet       sertraline (ZOLOFT) 50 MG tablet Take 1 tablet (50 mg) by mouth daily 90 tablet 3    tretinoin (RETIN-A) 0.1 % external cream APPLY A PEA SIZE AMOUNT TO ENTIRE FACE EVERY OTHER NIGHT INCREASING TO NIGHTLY AS TOLERATED.MOISTURIZE AFTER.         Social History     Tobacco Use    Smoking status: Former     Packs/day: 0.00     Years: 4.00     Additional pack years: 0.00     Total pack years: 0.00     Types: Cigarettes     Start date: 1980     Quit date: 1984     Years since quittin.9    Smokeless tobacco: Never    Tobacco comments:     during college   Substance Use Topics    Alcohol use: Yes     Alcohol/week: 3.0 standard drinks of alcohol     Comment: occasional glass of wine or beer with a meal       ROS  Review of systems negative except as stated above.    OBJECTIVE:  /70   Pulse 86   Temp 100.2  F (37.9  C) (Temporal)   Ht 1.575 m (5' 2\")   Wt 56.7 kg (125 lb)   SpO2 98%   BMI 22.86 kg/m  "   GENERAL APPEARANCE: healthy, alert and no distress  EYES: EOMI,  PERRL, conjunctiva clear  HENT: ear canals and TM's normal.  Nose and mouth without ulcers, erythema or lesions  NECK: supple, nontender, no lymphadenopathy  RESP: lungs good air entry positive for rales in the right lung   CV: regular rates and rhythm, normal S1 S2, no murmur noted  PSYCH: mentation appears normal    Taylor Ash MD

## 2023-11-21 NOTE — PATIENT INSTRUCTIONS
Symptomatic measures encouraged, humidified air, plenty of fluids.  Your appetite may be low, so a light diet is fine. Stay well hydrated by drinking 6 to 8 glasses of fluids per day. This includes water, soft drinks, sports drinks, juices, tea, or soup. Extra fluids will help loosen mucus in your nose and lungs.  Over-the-counter cough, cold, and sore-throat medicines will not shorten the length of the illness, but they may be helpful to reduce your symptoms. Finish all antibiotic medicine. Do this even if you are feeling better after only a few days.  Follow up if symptoms worsen  such as sob, high fever and chest pain in 2-3 days       Taylor Ash MD         Please finish the antibiotic even if you are feeling better.  Watch for worsening signs of infection     Taylor Ash MD

## 2023-11-27 ENCOUNTER — OFFICE VISIT (OUTPATIENT)
Dept: FAMILY MEDICINE | Facility: CLINIC | Age: 63
End: 2023-11-27
Payer: COMMERCIAL

## 2023-11-27 VITALS
OXYGEN SATURATION: 97 % | BODY MASS INDEX: 23.74 KG/M2 | WEIGHT: 129 LBS | DIASTOLIC BLOOD PRESSURE: 56 MMHG | RESPIRATION RATE: 20 BRPM | HEIGHT: 62 IN | TEMPERATURE: 98.9 F | SYSTOLIC BLOOD PRESSURE: 104 MMHG | HEART RATE: 80 BPM

## 2023-11-27 DIAGNOSIS — Z12.11 SCREEN FOR COLON CANCER: ICD-10-CM

## 2023-11-27 DIAGNOSIS — J18.9 COMMUNITY ACQUIRED PNEUMONIA OF RIGHT LOWER LOBE OF LUNG: Primary | ICD-10-CM

## 2023-11-27 PROCEDURE — 90471 IMMUNIZATION ADMIN: CPT | Performed by: FAMILY MEDICINE

## 2023-11-27 PROCEDURE — 90678 RSV VACC PREF BIVALENT IM: CPT | Performed by: FAMILY MEDICINE

## 2023-11-27 PROCEDURE — 99213 OFFICE O/P EST LOW 20 MIN: CPT | Mod: 25 | Performed by: FAMILY MEDICINE

## 2023-11-27 NOTE — PROGRESS NOTES
"Assessment & Plan   Community acquired pneumonia of right lower lobe of lung  Reassess, if not improved, consider a CT to rule out other causes of the pneumonia.  - XR Chest 2 Views    Screen for colon cancer  - Colonoscopy Screening  Referral   MED REC REQUIRED  Post Medication Reconciliation Status:     CORKY ESTRADA MD  [facility]  Deyvi Lal is a 63 year old, presenting for the following health issues:  Hospital F/U (The patient was seen in the urgent care.)    History of Present Illness       Reason for visit:  Pneumonia status    She eats 2-3 servings of fruits and vegetables daily.She consumes 0 sweetened beverage(s) daily.She exercises with enough effort to increase her heart rate 20 to 29 minutes per day.  She exercises with enough effort to increase her heart rate 5 days per week.   She is taking medications regularly.       Objective    /56 (BP Location: Left arm, Patient Position: Sitting, Cuff Size: Adult Regular)   Pulse 80   Temp 98.9  F (37.2  C) (Tympanic)   Resp 20   Ht 1.575 m (5' 2\")   Wt 58.5 kg (129 lb)   LMP  (LMP Unknown)   SpO2 97%   Breastfeeding No   BMI 23.59 kg/m    Body mass index is 23.59 kg/m .  Physical Exam   GENERAL: healthy, alert and no distress  EYES: Eyes grossly normal to inspection, PERRL and conjunctivae and sclerae normal  HENT: ear canals and TM's normal, nose and mouth without ulcers or lesions  NECK: no adenopathy, no asymmetry, masses, or scars and thyroid normal to palpation  RESP: Wet cough. Mild rales in the right base.  CV: regular rate and rhythm, normal S1 S2, no S3 or S4, no murmur, click or rub, no peripheral edema and peripheral pulses strong  ABDOMEN: soft, nontender, no hepatosplenomegaly, no masses and bowel sounds normal  MS: no gross musculoskeletal defects noted, no edema  SKIN: no suspicious lesions or rashes  NEURO: Normal strength and tone, mentation intact and speech normal  PSYCH: mentation " appears normal, affect normal/bright

## 2023-12-20 ENCOUNTER — ANCILLARY PROCEDURE (OUTPATIENT)
Dept: GENERAL RADIOLOGY | Facility: CLINIC | Age: 63
End: 2023-12-20
Payer: COMMERCIAL

## 2023-12-20 DIAGNOSIS — J18.9 COMMUNITY ACQUIRED PNEUMONIA OF RIGHT LOWER LOBE OF LUNG: ICD-10-CM

## 2023-12-20 PROCEDURE — 71046 X-RAY EXAM CHEST 2 VIEWS: CPT | Mod: TC | Performed by: STUDENT IN AN ORGANIZED HEALTH CARE EDUCATION/TRAINING PROGRAM

## 2024-02-05 ENCOUNTER — TELEPHONE (OUTPATIENT)
Dept: GASTROENTEROLOGY | Facility: CLINIC | Age: 64
End: 2024-02-05
Payer: COMMERCIAL

## 2024-02-05 NOTE — TELEPHONE ENCOUNTER
"Endoscopy Scheduling Screen    Have you had a positive Covid test in the last 14 days?  No    Are you active on MyChart?   Yes    What insurance is in the chart?  Other:  [employer]    Ordering/Referring Provider:   CORKY ESTRADA        (If ordering provider performs procedure, schedule with ordering provider unless otherwise instructed. )    BMI: Estimated body mass index is 23.59 kg/m  as calculated from the following:    Height as of 11/27/23: 1.575 m (5' 2\").    Weight as of 11/27/23: 58.5 kg (129 lb).     Sedation Ordered  moderate sedation.   If patient BMI > 50 do not schedule in [facility].    If patient BMI > 45 do not schedule at [facility].    Are you taking methadone or Suboxone?  No    Have you had difficulties, pain, or discomfort during past endoscopy procedures?  No    Are you taking any prescription medications for pain 3 or more times per week?   NO - No RN review required.    Do you have a history of malignant hyperthermia or adverse reaction to anesthesia?  No    (Females) Are you currently pregnant?   No     Have you been diagnosed or told you have pulmonary hypertension?   No    Do you have an LVAD?  No    Have you been told you have moderate to severe sleep apnea?  No    Have you been told you have COPD, asthma, or any other lung disease?  No    Do you have any heart conditions?  No     Have you ever had an organ transplant?   No    Have you ever had or are you awaiting a heart or lung transplant?   No    Have you had a stroke or transient ischemic attack (TIA aka \"mini stroke\" in the last 6 months?   No    Have you been diagnosed with or been told you have cirrhosis of the liver?   No    Are you currently on dialysis?   No    Do you need assistance transferring?   No    BMI: Estimated body mass index is 23.59 kg/m  as calculated from the following:    Height as of 11/27/23: 1.575 m (5' 2\").    Weight as of 11/27/23: 58.5 kg (129 lb).     Is patients BMI > 40 and scheduling location [facility]?  No    Do you take an " injectable medication for weight loss or diabetes (excluding insulin)?  No    Do you take the medication Naltrexone?  No    Do you take blood thinners?  No       Prep   Are you currently on dialysis or do you have chronic kidney disease?  No    Do you have a diagnosis of diabetes?  No    Do you have a diagnosis of cystic fibrosis (CF)?  No    On a regular basis do you go 3 -5 days between bowel movements?  No    BMI > 40?  No    Preferred Pharmacy:    [facility] - [place], [place] - [address] [place] [address]  Phone: [phone] Fax: [phone]      Final Scheduling Details   Colonoscopy prep sent?  N/A    Procedure scheduled  Colonoscopy    Surgeon:  OJSE     Date of procedure:  07/22/2024     Pre-OP / PAC:   No - Not required for this site.    Location  [facility] - Patient preference.    Sedation   MAC/Deep Sedation  Per Location      Patient Reminders:   You will receive a call from a Nurse to review instructions and health history.  This assessment must be completed prior to your procedure.  Failure to complete the Nurse assessment may result in the procedure being cancelled.      On the day of your procedure, please designate an adult(s) who can drive you home stay with you for the next 24 hours. The medicines used in the exam will make you sleepy. You will not be able to drive.      You cannot take public transportation, ride share services, or non-medical taxi service without a responsible caregiver.  Medical transport services are allowed with the requirement that a responsible caregiver will receive you at your destination.  We require that drivers and caregivers are confirmed prior to your procedure.

## 2024-05-30 ENCOUNTER — TRANSFERRED RECORDS (OUTPATIENT)
Dept: HEALTH INFORMATION MANAGEMENT | Facility: CLINIC | Age: 64
End: 2024-05-30
Payer: COMMERCIAL

## 2024-06-04 SDOH — HEALTH STABILITY: PHYSICAL HEALTH: ON AVERAGE, HOW MANY DAYS PER WEEK DO YOU ENGAGE IN MODERATE TO STRENUOUS EXERCISE (LIKE A BRISK WALK)?: 7 DAYS

## 2024-06-04 SDOH — HEALTH STABILITY: PHYSICAL HEALTH: ON AVERAGE, HOW MANY MINUTES DO YOU ENGAGE IN EXERCISE AT THIS LEVEL?: 40 MIN

## 2024-06-04 ASSESSMENT — SOCIAL DETERMINANTS OF HEALTH (SDOH): HOW OFTEN DO YOU GET TOGETHER WITH FRIENDS OR RELATIVES?: MORE THAN THREE TIMES A WEEK

## 2024-06-07 ENCOUNTER — OFFICE VISIT (OUTPATIENT)
Dept: FAMILY MEDICINE | Facility: CLINIC | Age: 64
End: 2024-06-07
Payer: COMMERCIAL

## 2024-06-07 VITALS
RESPIRATION RATE: 18 BRPM | OXYGEN SATURATION: 98 % | SYSTOLIC BLOOD PRESSURE: 130 MMHG | TEMPERATURE: 97.2 F | HEART RATE: 66 BPM | HEIGHT: 62 IN | BODY MASS INDEX: 23.3 KG/M2 | WEIGHT: 126.6 LBS | DIASTOLIC BLOOD PRESSURE: 80 MMHG

## 2024-06-07 DIAGNOSIS — E78.2 MIXED HYPERLIPIDEMIA: ICD-10-CM

## 2024-06-07 DIAGNOSIS — Z00.00 ROUTINE GENERAL MEDICAL EXAMINATION AT A HEALTH CARE FACILITY: Primary | ICD-10-CM

## 2024-06-07 DIAGNOSIS — F41.0 PANIC ATTACK: ICD-10-CM

## 2024-06-07 DIAGNOSIS — Z85.828 HISTORY OF BASAL CELL CARCINOMA: ICD-10-CM

## 2024-06-07 DIAGNOSIS — F41.1 GAD (GENERALIZED ANXIETY DISORDER): ICD-10-CM

## 2024-06-07 DIAGNOSIS — Z12.11 SCREEN FOR COLON CANCER: ICD-10-CM

## 2024-06-07 PROCEDURE — 90471 IMMUNIZATION ADMIN: CPT | Performed by: FAMILY MEDICINE

## 2024-06-07 PROCEDURE — 99214 OFFICE O/P EST MOD 30 MIN: CPT | Mod: 25 | Performed by: FAMILY MEDICINE

## 2024-06-07 PROCEDURE — 99396 PREV VISIT EST AGE 40-64: CPT | Mod: 25 | Performed by: FAMILY MEDICINE

## 2024-06-07 PROCEDURE — 36415 COLL VENOUS BLD VENIPUNCTURE: CPT | Performed by: FAMILY MEDICINE

## 2024-06-07 PROCEDURE — 80061 LIPID PANEL: CPT | Performed by: FAMILY MEDICINE

## 2024-06-07 PROCEDURE — 90677 PCV20 VACCINE IM: CPT | Performed by: FAMILY MEDICINE

## 2024-06-07 RX ORDER — LORAZEPAM 0.5 MG/1
0.5 TABLET ORAL DAILY PRN
Qty: 10 TABLET | Refills: 0 | Status: SHIPPED | OUTPATIENT
Start: 2024-06-07

## 2024-06-07 ASSESSMENT — ANXIETY QUESTIONNAIRES
6. BECOMING EASILY ANNOYED OR IRRITABLE: NOT AT ALL
5. BEING SO RESTLESS THAT IT IS HARD TO SIT STILL: NOT AT ALL
1. FEELING NERVOUS, ANXIOUS, OR ON EDGE: MORE THAN HALF THE DAYS
2. NOT BEING ABLE TO STOP OR CONTROL WORRYING: SEVERAL DAYS
GAD7 TOTAL SCORE: 4
GAD7 TOTAL SCORE: 4
4. TROUBLE RELAXING: NOT AT ALL
7. FEELING AFRAID AS IF SOMETHING AWFUL MIGHT HAPPEN: NOT AT ALL
3. WORRYING TOO MUCH ABOUT DIFFERENT THINGS: SEVERAL DAYS
IF YOU CHECKED OFF ANY PROBLEMS ON THIS QUESTIONNAIRE, HOW DIFFICULT HAVE THESE PROBLEMS MADE IT FOR YOU TO DO YOUR WORK, TAKE CARE OF THINGS AT HOME, OR GET ALONG WITH OTHER PEOPLE: NOT DIFFICULT AT ALL

## 2024-06-07 ASSESSMENT — PAIN SCALES - GENERAL: PAINLEVEL: NO PAIN (0)

## 2024-06-07 NOTE — PROGRESS NOTES
Prior to immunization administration, verified patients identity using patient s name and date of birth. Please see Immunization Activity for additional information.     Screening Questionnaire for Adult Immunization    Are you sick today?   No   Do you have allergies to medications, food, a vaccine component or latex?   Yes. Keflex, lexapro   Have you ever had a serious reaction after receiving a vaccination?   No   Do you have a long-term health problem with heart, lung, kidney, or metabolic disease (e.g., diabetes), asthma, a blood disorder, no spleen, complement component deficiency, a cochlear implant, or a spinal fluid leak?  Are you on long-term aspirin therapy?   No   Do you have cancer, leukemia, HIV/AIDS, or any other immune system problem?   No   Do you have a parent, brother, or sister with an immune system problem?   No   In the past 3 months, have you taken medications that affect  your immune system, such as prednisone, other steroids, or anticancer drugs; drugs for the treatment of rheumatoid arthritis, Crohn s disease, or psoriasis; or have you had radiation treatments?   No   Have you had a seizure, or a brain or other nervous system problem?   No   During the past year, have you received a transfusion of blood or blood    products, or been given immune (gamma) globulin or antiviral drug?   No   For women: Are you pregnant or is there a chance you could become       pregnant during the next month?   No   Have you received any vaccinations in the past 4 weeks?   No     Immunization questionnaire was positive for at least one answer.  Notified Dr. Malhotra.      Patient instructed to remain in clinic for 15 minutes afterwards, and to report any adverse reactions.     Screening performed by Yossi Ny RN on 6/7/2024 at 9:47 AM.

## 2024-06-07 NOTE — PATIENT INSTRUCTIONS
"RORY Gottlieb PA Dr Maggie Kappelman Dr. Louisa Paul Dr. Avery Tucker Andrew Seliga, PA Hayden Middleton, PA    To reach my nurse team (I.e to find out if I can squeeze you in for appointment) call our clinic [phone] and say \"Care Team\".  This will bring you to my local support team.  I can often squeeze people in for important things, or recommend you see one of my excellent partners.    Next year: Make your appointment for next year's physical now, and schedule a Lab Only Appointment 1-2 days before so we can chat about your results at the time of your appointment.         Think of Nature as a multivitamin that you should take every day.  Make an effort to spend time outside every day.    If you spend sultana in [place] vitamin D 400-1000 daily is a good idea October-April.    Social connections are also like a multivitamin; they give us micro-boosts that keep us healthy and happy. Talk to the check out person in the store, connect with your neighbors.  Make an effort to maintain and sustain social connections in your life.    Food is Medicine. The MIND or Mediterranean diet are the best for heart and brain health as well as have a lower lifetime cancer risk.    Weight lifting exercise 2-3x per week is excellent for bone health and maintaining muscle mass, particularly if you are over 40.  It's also helpful in reducing anxiety and boosting mood.    Move your body every day (exercise!).  Exercise is the most effective way to boost mood, reduce anxiety and depression, reduce risks of many chronic diseases and age well.  Find something you enjoy and do it regularly (walk, run, take a dance class, join a gym, ski, roller-blade, get into yoga, learn stacey chi...)    Prioritize your sleep! Adults age 26-64 need 7-9 hours of sleep a night.  Older adults 65+ need 7-8 hours of sleep a night.  Studies show that people who sleep seven hours a night are healthier and live longer. Sleeping less than seven " "hours is associated with a range of health problems including obesity, dementia, heart disease, depression and impaired immune function.    Patient Education    Preventive Care Advice   This is general advice we often give to help people stay healthy. Your care team may have specific advice just for you. Please talk to your care team about your own preventive care needs.  Lifestyle  Exercise at least 150 minutes each week (30 minutes a day, 5 days a week).  Do muscle strengthening activities 2 days a week. These help control your weight and prevent disease.  No smoking.  Wear sunscreen to prevent skin cancer.  Have your home tested for radon every 2 to 5 years. Radon is a colorless, odorless gas that can harm your lungs. To learn more, go to www.health.[facility].[place] and search for \"Radon in Homes.\"  Keep guns unloaded and locked up in a safe place like a safe or gun vault, or, use a gun lock and hide the keys. Always lock away bullets separately. To learn more, visit [employer].[place].gov and search for \"safe gun storage.\"  Nutrition  Eat 5 or more servings of fruits and vegetables each day.  Try wheat bread, brown rice and whole grain pasta (instead of white bread, rice, and pasta).  Get enough calcium and vitamin D. Check the label on foods and aim for 100% of the RDA (recommended daily allowance).  Regular exams  Have a dental exam and cleaning every 6 months.  See your health care team every year to talk about:  Any changes in your health.  Any medicines your care team has prescribed.  Preventive care, family planning, and ways to prevent chronic diseases.  Shots (vaccines)   HPV shots (up to age 26), if you've never had them before.  Hepatitis B shots (up to age 59), if you've never had them before.  COVID-19 shot: Get this shot when it's due.  Flu shot: Get a flu shot every year.  Tetanus shot: Get a tetanus shot every 10 years.  Pneumococcal, hepatitis A, and RSV shots: Ask your care team if you need these based on your " risk.  Shingles shot (for age 50 and up).  General health tests  Diabetes screening:  Starting at age 35, Get screened for diabetes at least every 3 years.  If you are younger than age 35, ask your care team if you should be screened for diabetes.  Cholesterol test: At age 39, start having a cholesterol test every 5 years, or more often if advised.  Bone density scan (DEXA): At age 50, ask your care team if you should have this scan for osteoporosis (brittle bones).  Hepatitis C: Get tested at least once in your life.  Abdominal aortic aneurysm screening: Talk to your doctor about having this screening if you:  Have ever smoked; and  Are biologically male; and  Are between the ages of 65 and 75.  STIs (sexually transmitted infections)  Before age 24: Ask your care team if you should be screened for STIs.  After age 24: Get screened for STIs if you're at risk. You are at risk for STIs (including HIV) if:  You are sexually active with more than one person.  You don't use condoms every time.  You or a partner was diagnosed with a sexually transmitted infection.  If you are at risk for HIV, ask about PrEP medicine to prevent HIV.  Get tested for HIV at least once in your life, whether you are at risk for HIV or not.  Cancer screening tests  Cervical cancer screening: If you have a cervix, begin getting regular cervical cancer screening tests at age 21. Most people who have regular screenings with normal results can stop after age 65. Talk about this with your provider.  Breast cancer scan (mammogram): If you've ever had breasts, begin having regular mammograms starting at age 40. This is a scan to check for breast cancer.  Colon cancer screening: It is important to start screening for colon cancer at age 45.  Have a colonoscopy test every 10 years (or more often if you're at risk) Or, ask your provider about stool tests like a FIT test every year or Cologuard test every 3 years.  To learn more about your testing  "options, visit: www.fvfiles.com/348901.pdf.  For help making a decision, visit: nicole/wm82443.  Prostate cancer screening test: If you have a prostate and are age 55 to 69, ask your provider if you would benefit from a yearly prostate cancer screening test.  Lung cancer screening: If you are a current or former smoker age 50 to 80, ask your care team if ongoing lung cancer screenings are right for you.  For informational purposes only. Not to replace the advice of your health care provider. Copyright   2023 [facility]. All rights reserved. Clinically reviewed by the [facility] Transitions Program. Personeta [id number] - REV 04/24.    Preventive Care Advice   This is general advice we often give to help people stay healthy. Your care team may have specific advice just for you. Please talk to your care team about your own preventive care needs.  Lifestyle  Exercise at least 150 minutes each week (30 minutes a day, 5 days a week).  Do muscle strengthening activities 2 days a week. These help control your weight and prevent disease.  No smoking.  Wear sunscreen to prevent skin cancer.  Have your home tested for radon every 2 to 5 years. Radon is a colorless, odorless gas that can harm your lungs. To learn more, go to www.health.[facility].[place]. and search for \"Radon in Homes.\"  Keep guns unloaded and locked up in a safe place like a safe or gun vault, or, use a gun lock and hide the keys. Always lock away bullets separately. To learn more, visit [employer].[place].gov and search for \"safe gun storage.\"  Nutrition  Eat 5 or more servings of fruits and vegetables each day.  Try wheat bread, brown rice and whole grain pasta (instead of white bread, rice, and pasta).  Get enough calcium and vitamin D. Check the label on foods and aim for 100% of the RDA (recommended daily allowance).  Regular exams  Have a dental exam and cleaning every 6 months.  See your health care team every year to talk about:  Any changes in your " health.  Any medicines your care team has prescribed.  Preventive care, family planning, and ways to prevent chronic diseases.  Shots (vaccines)   HPV shots (up to age 26), if you've never had them before.  Hepatitis B shots (up to age 59), if you've never had them before.  COVID-19 shot: Get this shot when it's due.  Flu shot: Get a flu shot every year.  Tetanus shot: Get a tetanus shot every 10 years.  Pneumococcal, hepatitis A, and RSV shots: Ask your care team if you need these based on your risk.  Shingles shot (for age 50 and up).  General health tests  Diabetes screening:  Starting at age 35, Get screened for diabetes at least every 3 years.  If you are younger than age 35, ask your care team if you should be screened for diabetes.  Cholesterol test: At age 39, start having a cholesterol test every 5 years, or more often if advised.  Bone density scan (DEXA): At age 50, ask your care team if you should have this scan for osteoporosis (brittle bones).  Hepatitis C: Get tested at least once in your life.  Abdominal aortic aneurysm screening: Talk to your doctor about having this screening if you:  Have ever smoked; and  Are biologically male; and  Are between the ages of 65 and 75.  STIs (sexually transmitted infections)  Before age 24: Ask your care team if you should be screened for STIs.  After age 24: Get screened for STIs if you're at risk. You are at risk for STIs (including HIV) if:  You are sexually active with more than one person.  You don't use condoms every time.  You or a partner was diagnosed with a sexually transmitted infection.  If you are at risk for HIV, ask about PrEP medicine to prevent HIV.  Get tested for HIV at least once in your life, whether you are at risk for HIV or not.  Cancer screening tests  Cervical cancer screening: If you have a cervix, begin getting regular cervical cancer screening tests at age 21. Most people who have regular screenings with normal results can stop after  age 65. Talk about this with your provider.  Breast cancer scan (mammogram): If you've ever had breasts, begin having regular mammograms starting at age 40. This is a scan to check for breast cancer.  Colon cancer screening: It is important to start screening for colon cancer at age 45.  Have a colonoscopy test every 10 years (or more often if you're at risk) Or, ask your provider about stool tests like a FIT test every year or Cologuard test every 3 years.  To learn more about your testing options, visit: www.OnMyBlock/255106.pdf.  For help making a decision, visit: nicole/bm68335.  Prostate cancer screening test: If you have a prostate and are age 55 to 69, ask your provider if you would benefit from a yearly prostate cancer screening test.  Lung cancer screening: If you are a current or former smoker age 50 to 80, ask your care team if ongoing lung cancer screenings are right for you.  For informational purposes only. Not to replace the advice of your health care provider. Copyright   2023 [employer]. All rights reserved. Clinically reviewed by the [facility] Transitions Program. Pandora.TV [id number] - REV 04/24.

## 2024-06-07 NOTE — PROGRESS NOTES
Preventive Care Visit  [facility]  Yue Malhotra MD, Family Medicine  Jun 7, 2024      Assessment & Plan     Problem List as of 6/7/2024 Reviewed: 6/7/2024  9:14 AM by Yue Malhotra MD            Noted       High    1. Routine general medical examination at a health care facility - Primary 6/7/2024     Overview Addendum 6/7/2024  9:25 AM by Yue Malhotra MD      6/2024 A/P:  Mammo: yearly, due in July 2025  Pap: no longer indicated, hysterectomy with cervix removed  Colon: due, scheduled for July  Immunizations: up to date   Labs: see below, ordered  Discussed healthy lifestyle and aging recommendations including regular exercise, adequate and regular sleep, 5+ fruits and veggies daily.          Relevant Orders     MA Screening Bilateral w/ Samson       Medium    2. ERIK (generalized anxiety disorder) 6/2/2022     Overview Addendum 6/7/2024  9:35 AM by Yue Malhotra MD      6/2024 A/P:  Stable on sertraline, working well.  50 mg.  May consider going up to  in winter months, sent for this possibility  Refilled.  Rare lorazepam #10 /yr refilled.  Checked , no concerns.    2022:Lexapro did not work well  2023: Sertraline effective although difficulty with initiation.  Rare lorazepam for panic #10 in 1 year          Relevant Medications     LORazepam (ATIVAN) 0.5 MG tablet     sertraline (ZOLOFT) 50 MG tablet    3. History of basal cell carcinoma 6/7/2024     Overview Signed 6/7/2024  9:16 AM by Yue Malhotra MD      2024: stable, follows with Dr. Jasso.  Sees every 6 months.         4. Mixed hyperlipidemia 7/18/2019     Overview Addendum 6/7/2024  9:38 AM by Yue Malhotra MD      06/07/2024 A/P:  Likely famililal as has always been high LDL and HDL despite diet and exercise  Will do CT Calcium - if has calcifications, she would be willing to add statin and ASA    Hx: 2023: ASCVD score 3.4% Discussed calcium coronary CT - she's considering           Relevant Orders     Lipid panel reflex to direct LDL Fasting     CT Coronary Calcium Scan           Counseling  Appropriate preventive services were discussed with this patient, including applicable screening as appropriate for fall prevention, nutrition, physical activity, Tobacco-use cessation, weight loss and cognition.  Checklist reviewing preventive services available has been given to the patient.  Reviewed patient's diet, addressing concerns and/or questions.           Subjective   Hali is a 63 year old, presenting for the following:  Physical        6/7/2024     8:42 AM   Additional Questions   Roomed by Cherry dominguez   Accompanied by self        Health Care Directive  Patient does not have a Health Care Directive or Living Will: Patient states has Advance Directive and will bring in a copy to clinic.    [facility]    Anxiety medicine; would like refilled.  Really feels it's helpful.  Has bouts of anxiety, mostly in winter.  Feels it's seasonal.  Enough to help her get through.  Meditation, connect with friends and family, distract self.          4/26/2022    12:17 PM 7/5/2022     3:19 PM 8/15/2022    10:38 AM   ERIK-7 SCORE   Total Score 10 (moderate anxiety) 6 (mild anxiety) 2 (minimal anxiety)   Total Score 10 6 2         Made it last the whole year.            6/4/2024   General Health   How would you rate your overall physical health? Excellent   Feel stress (tense, anxious, or unable to sleep) To some extent   (!) STRESS CONCERN      6/4/2024   Nutrition   Three or more servings of calcium each day? Yes   Diet: Regular (no restrictions)   How many servings of fruit and vegetables per day? (!) 2-3   How many sweetened beverages each day? 0-1         6/4/2024   Exercise   Days per week of moderate/strenous exercise 7 days   Average minutes spent exercising at this level 40 min         6/4/2024   Social Factors   Frequency of gathering with friends or relatives More than three times a  week   Worry food won't last until get money to buy more No   Food not last or not have enough money for food? No   Do you have housing?  Yes   Are you worried about losing your housing? No   Lack of transportation? No   Unable to get utilities (heat,electricity)? No         2024   Fall Risk   Fallen 2 or more times in the past year? No   Trouble with walking or balance? No          2024   Dental   Dentist two times every year? Yes         2024   TB Screening   Were you born outside of the [place]? No         Today's PHQ-2 Score:       2024     8:23 AM   PHQ-2 (  [employer])   Q1: Little interest or pleasure in doing things 0   Q2: Feeling down, depressed or hopeless 0   PHQ-2 Score 0   Q1: Little interest or pleasure in doing things Not at all   Q2: Feeling down, depressed or hopeless Not at all   PHQ-2 Score 0           2024   Substance Use   Alcohol more than 3/day or more than 7/wk No   Do you use any other substances recreationally? No     Social History     Tobacco Use    Smoking status: Former     Current packs/day: 0.00     Types: Cigarettes     Start date: 1980     Quit date: 1984     Years since quittin.4    Smokeless tobacco: Never    Tobacco comments:     during college   Vaping Use    Vaping status: Never Used   Substance Use Topics    Alcohol use: Yes     Alcohol/week: 3.0 standard drinks of alcohol     Comment: occasional glass of wine or beer with a meal    Drug use: Never           2023   LAST FHS-7 RESULTS   1st degree relative breast or ovarian cancer Yes   Any relative bilateral breast cancer No   Any male have breast cancer No   Any ONE woman have BOTH breast AND ovarian cancer No   Any woman with breast cancer before 50yrs No   2 or more relatives with breast AND/OR ovarian cancer No   2 or more relatives with breast AND/OR bowel cancer No                2024   STI Screening   New sexual partner(s) since last STI/HIV test? No     History of abnormal Pap  "smear:        ASCVD Risk   The 10-year ASCVD risk score (Leena MENDIETA, et al., 2019) is: 3.8%    Values used to calculate the score:      Age: 63 years      Sex: Female      Is Non- : No      Diabetic: No      Tobacco smoker: No      Systolic Blood Pressure: 130 mmHg      Is BP treated: No      HDL Cholesterol: 74 mg/dL      Total Cholesterol: 258 mg/dL           Reviewed and updated as needed this visit by Provider   Tobacco  Allergies  Meds  Problems  Med Hx  Surg Hx  Fam Hx                     Objective    Exam  /80 (BP Location: Right arm, Patient Position: Sitting, Cuff Size: Adult Regular)   Pulse 66   Temp 97.2  F (36.2  C) (Temporal)   Resp 18   Ht 1.582 m (5' 2.3\")   Wt 57.4 kg (126 lb 9.6 oz)   LMP  (LMP Unknown)   SpO2 98%   BMI 22.93 kg/m     Estimated body mass index is 22.93 kg/m  as calculated from the following:    Height as of this encounter: 1.582 m (5' 2.3\").    Weight as of this encounter: 57.4 kg (126 lb 9.6 oz).    Physical Exam  GENERAL: alert and no distress  EYES: Eyes grossly normal to inspection, PERRL and conjunctivae and sclerae normal  HENT: ear canals and TM's normal, nose and mouth without ulcers or lesions  NECK: no adenopathy, no asymmetry, masses, or scars  RESP: lungs clear to auscultation - no rales, rhonchi or wheezes  CV: regular rate and rhythm, normal S1 S2, no S3 or S4, no murmur, click or rub, no peripheral edema  ABDOMEN: soft, nontender, no hepatosplenomegaly, no masses and bowel sounds normal  MS: no gross musculoskeletal defects noted, no edema  SKIN: no suspicious lesions or rashes  NEURO: Normal strength and tone, mentation intact and speech normal  PSYCH: mentation appears normal, affect normal/bright        Signed Electronically by: Yue Malhotra MD    "

## 2024-06-08 LAB
CHOLEST SERPL-MCNC: 278 MG/DL
FASTING STATUS PATIENT QL REPORTED: YES
HDLC SERPL-MCNC: 75 MG/DL
LDLC SERPL CALC-MCNC: 171 MG/DL
NONHDLC SERPL-MCNC: 203 MG/DL
TRIGL SERPL-MCNC: 160 MG/DL

## 2024-06-13 ENCOUNTER — PATIENT OUTREACH (OUTPATIENT)
Dept: CARE COORDINATION | Facility: CLINIC | Age: 64
End: 2024-06-13
Payer: COMMERCIAL

## 2024-07-11 ENCOUNTER — PATIENT OUTREACH (OUTPATIENT)
Dept: CARE COORDINATION | Facility: CLINIC | Age: 64
End: 2024-07-11
Payer: COMMERCIAL

## 2024-07-16 ENCOUNTER — HOSPITAL ENCOUNTER (OUTPATIENT)
Dept: CT IMAGING | Facility: CLINIC | Age: 64
Discharge: HOME | End: 2024-07-16
Attending: FAMILY MEDICINE | Admitting: FAMILY MEDICINE
Payer: COMMERCIAL

## 2024-07-16 DIAGNOSIS — E78.2 MIXED HYPERLIPIDEMIA: ICD-10-CM

## 2024-07-16 PROCEDURE — 75571 CT HRT W/O DYE W/CA TEST: CPT

## 2024-07-16 PROCEDURE — 75571 CT HRT W/O DYE W/CA TEST: CPT | Mod: 26 | Performed by: INTERNAL MEDICINE

## 2024-07-17 LAB
CV CALCIUM SCORE AGATSTON LM: 0
CV CALCIUM SCORING AGATSON LAD: 0
CV CALCIUM SCORING AGATSTON CX: 0
CV CALCIUM SCORING AGATSTON RCA: 0
CV CALCIUM SCORING AGATSTON TOTAL: 0

## 2024-07-19 ENCOUNTER — ANESTHESIA EVENT (OUTPATIENT)
Dept: SURGERY | Facility: AMBULATORY SURGERY CENTER | Age: 64
End: 2024-07-19
Payer: COMMERCIAL

## 2024-07-22 ENCOUNTER — ANESTHESIA (OUTPATIENT)
Dept: SURGERY | Facility: AMBULATORY SURGERY CENTER | Age: 64
End: 2024-07-22
Payer: COMMERCIAL

## 2024-07-22 ENCOUNTER — HOSPITAL ENCOUNTER (OUTPATIENT)
Facility: AMBULATORY SURGERY CENTER | Age: 64
Discharge: HOME | End: 2024-07-22
Attending: SURGERY
Payer: COMMERCIAL

## 2024-07-22 VITALS
SYSTOLIC BLOOD PRESSURE: 111 MMHG | TEMPERATURE: 96.8 F | OXYGEN SATURATION: 98 % | RESPIRATION RATE: 16 BRPM | DIASTOLIC BLOOD PRESSURE: 61 MMHG

## 2024-07-22 DIAGNOSIS — Z12.11 SCREEN FOR COLON CANCER: ICD-10-CM

## 2024-07-22 LAB — COLONOSCOPY: NORMAL

## 2024-07-22 RX ORDER — ONDANSETRON 4 MG/1
4 TABLET, ORALLY DISINTEGRATING ORAL EVERY 30 MIN PRN
Status: DISCONTINUED | OUTPATIENT
Start: 2024-07-22 | End: 2024-07-23 | Stop reason: HOSPADM

## 2024-07-22 RX ORDER — DEXAMETHASONE SODIUM PHOSPHATE 4 MG/ML
4 INJECTION, SOLUTION INTRA-ARTICULAR; INTRALESIONAL; INTRAMUSCULAR; INTRAVENOUS; SOFT TISSUE
Status: DISCONTINUED | OUTPATIENT
Start: 2024-07-22 | End: 2024-07-23 | Stop reason: HOSPADM

## 2024-07-22 RX ORDER — NALOXONE HYDROCHLORIDE 0.4 MG/ML
0.1 INJECTION, SOLUTION INTRAMUSCULAR; INTRAVENOUS; SUBCUTANEOUS
Status: DISCONTINUED | OUTPATIENT
Start: 2024-07-22 | End: 2024-07-23 | Stop reason: HOSPADM

## 2024-07-22 RX ORDER — ONDANSETRON 2 MG/ML
INJECTION INTRAMUSCULAR; INTRAVENOUS PRN
Status: DISCONTINUED | OUTPATIENT
Start: 2024-07-22 | End: 2024-07-22

## 2024-07-22 RX ORDER — PROPOFOL 10 MG/ML
INJECTION, EMULSION INTRAVENOUS CONTINUOUS PRN
Status: DISCONTINUED | OUTPATIENT
Start: 2024-07-22 | End: 2024-07-22

## 2024-07-22 RX ORDER — PROPOFOL 10 MG/ML
INJECTION, EMULSION INTRAVENOUS PRN
Status: DISCONTINUED | OUTPATIENT
Start: 2024-07-22 | End: 2024-07-22

## 2024-07-22 RX ORDER — SODIUM CHLORIDE, SODIUM LACTATE, POTASSIUM CHLORIDE, CALCIUM CHLORIDE 600; 310; 30; 20 MG/100ML; MG/100ML; MG/100ML; MG/100ML
INJECTION, SOLUTION INTRAVENOUS CONTINUOUS
Status: DISCONTINUED | OUTPATIENT
Start: 2024-07-22 | End: 2024-07-23 | Stop reason: HOSPADM

## 2024-07-22 RX ORDER — GLYCOPYRROLATE 0.2 MG/ML
INJECTION, SOLUTION INTRAMUSCULAR; INTRAVENOUS PRN
Status: DISCONTINUED | OUTPATIENT
Start: 2024-07-22 | End: 2024-07-22

## 2024-07-22 RX ORDER — ONDANSETRON 2 MG/ML
4 INJECTION INTRAMUSCULAR; INTRAVENOUS EVERY 30 MIN PRN
Status: DISCONTINUED | OUTPATIENT
Start: 2024-07-22 | End: 2024-07-23 | Stop reason: HOSPADM

## 2024-07-22 RX ORDER — LIDOCAINE HYDROCHLORIDE 20 MG/ML
INJECTION, SOLUTION INFILTRATION; PERINEURAL PRN
Status: DISCONTINUED | OUTPATIENT
Start: 2024-07-22 | End: 2024-07-22

## 2024-07-22 RX ORDER — LIDOCAINE 40 MG/G
CREAM TOPICAL
Status: DISCONTINUED | OUTPATIENT
Start: 2024-07-22 | End: 2024-07-23 | Stop reason: HOSPADM

## 2024-07-22 RX ADMIN — PROPOFOL 50 MG: 10 INJECTION, EMULSION INTRAVENOUS at 08:24

## 2024-07-22 RX ADMIN — LIDOCAINE HYDROCHLORIDE 2.5 ML: 20 INJECTION, SOLUTION INFILTRATION; PERINEURAL at 08:22

## 2024-07-22 RX ADMIN — PROPOFOL 160 MCG/KG/MIN: 10 INJECTION, EMULSION INTRAVENOUS at 08:23

## 2024-07-22 RX ADMIN — ONDANSETRON 4 MG: 2 INJECTION INTRAMUSCULAR; INTRAVENOUS at 08:24

## 2024-07-22 RX ADMIN — SODIUM CHLORIDE, SODIUM LACTATE, POTASSIUM CHLORIDE, CALCIUM CHLORIDE: 600; 310; 30; 20 INJECTION, SOLUTION INTRAVENOUS at 07:52

## 2024-07-22 RX ADMIN — GLYCOPYRROLATE 0.2 MG: 0.2 INJECTION, SOLUTION INTRAMUSCULAR; INTRAVENOUS at 08:40

## 2024-07-22 NOTE — ANESTHESIA POSTPROCEDURE EVALUATION
Patient: Hali Hebert    Procedure: Procedure(s):  COLONOSCOPY  COLONOSCOPY, WITH POLYPECTOMY AND BIOPSY       Anesthesia Type:  MAC    Note:  Disposition: Outpatient   Postop Pain Control: Uneventful            Sign Out: Well controlled pain   PONV: No   Neuro/Psych: Uneventful            Sign Out: Acceptable/Baseline neuro status   Airway/Respiratory: Uneventful            Sign Out: Acceptable/Baseline resp. status   CV/Hemodynamics: Uneventful            Sign Out: Acceptable CV status; No obvious hypovolemia; No obvious fluid overload   Other NRE: NONE   DID A NON-ROUTINE EVENT OCCUR? No           Last vitals:  Vitals Value Taken Time   /61 07/22/24 0915   Temp     Pulse 66 07/22/24 0930   Resp     SpO2 98 % 07/22/24 0930   Vitals shown include unfiled device data.    Electronically Signed By: Emmanuel Roberson MD  July 22, 2024  10:28 AM

## 2024-07-22 NOTE — DISCHARGE INSTRUCTIONS
If you have any questions or concerns regarding your procedure, please contact Dr. Huffman, his office number is [phone].    Discharge Instructions:    Take you medications as directed and follow up with you primary provider as scheduled.   You should expect to pass air from your rectum after the procedure.   Follow these care guidelines during your recovery for the next 24 hours.   If you have any questions or concerns please contact the provider that performed your procedure.     You were given medicine for sedation:  You have been given medicines during your procedure that might make you sleepy and weak. To prevent problems:    *Rest for the rest of the day after you are home. You should be back to your normal activity tomorrow.  *For the next 24 hours:   -Do not drink alcoholic beverages.   -Do not make any important decisions or sign any legal forms.   -Do not work around machinery or power equipment.     The medicines used for sedation may make you feel nauseated.   *Start with clear liquids, such as tea, jell-o, broth and ginger ale. As you feel better you may add soft foods such as pudding and ice cream.   *When you no longer feel nauseated, you may try your normal diet.     You should be back to eating your normal after 24 hours.     Call if you have any of these problems:  *Fever of 101 degree F or 38 degrees C  *Bleeding from the rectum  *Black stool or blood in your bowel movements  *Nausea with vomiting that does not ease after a few hours.  *Abdominal pain or bloating  *Fainting         [facility]   Adult Discharge Orders & Instructions     For 24 hours after surgery    Get plenty of rest.  A responsible adult must stay with you for at least 24 hours after you leave the hospital.   Do not drive or use heavy equipment.  If you have weakness or tingling, don't drive or use heavy equipment until this feeling goes away.  Do not drink alcohol.  Avoid strenuous or risky activities.  Ask for  help when climbing stairs.   You may feel lightheaded.  IF so, sit for a few minutes before standing.  Have someone help you get up.   If you have nausea (feel sick to your stomach): Drink only clear liquids such as apple juice, ginger ale, broth or 7-Up.  Rest may also help.  Be sure to drink enough fluids.  Move to a regular diet as you feel able.  You may have a slight fever. Call the doctor if your fever is over 100 F (37.7 C) (taken under the tongue) or lasts longer than 24 hours.  You may have a dry mouth, a sore throat, muscle aches or trouble sleeping.  These should go away after 24 hours.  Do not make important or legal decisions.   Call your doctor for any of the followin.  Signs of infection (fever, growing tenderness at the surgery site, a large amount of drainage or bleeding, severe pain, foul-smelling drainage, redness, swelling).    2. It has been over 8 to 10 hours since surgery and you are still not able to urinate (pass water).    3.  Headache for over 24 hours.

## 2024-07-22 NOTE — H&P
PRE PROCEDURE NOTE - H & P      Chief Complaint/Reason for Procedure:              screening colonoscopy    Current Outpatient Medications   Medication Sig Dispense Refill    fluticasone furoate 27.5 MCG/SPRAY nasal spray       LORazepam (ATIVAN) 0.5 MG tablet Take 1 tablet (0.5 mg) by mouth daily as needed for anxiety 10 tablet 0    melatonin 5 MG CAPS       multivitamin therapeutic (THERAGRAN) tablet [MULTIVITAMIN THERAPEUTIC (THERAGRAN) TABLET] Take 1 tablet by mouth daily.      niacinamide 500 MG tablet       sertraline (ZOLOFT) 50 MG tablet Take 1-2 tablets ( mg) by mouth daily Take 50 mg April-September and  mg October-March 180 tablet 4    tretinoin (RETIN-A) 0.1 % external cream APPLY A PEA SIZE AMOUNT TO ENTIRE FACE EVERY OTHER NIGHT INCREASING TO NIGHTLY AS TOLERATED.MOISTURIZE AFTER.       Current Facility-Administered Medications   Medication Dose Route Frequency Provider Last Rate Last Admin    lactated ringers infusion   Intravenous Continuous Tammy Leos  mL/hr at 07/22/24 0752 New Bag at 07/22/24 0752    lidocaine (LMX4) kit   Topical Q1H PRN Tammy Leos MD        lidocaine 1 % 0.1-1 mL  0.1-1 mL Other Q1H PRN Tammy Leos MD        sodium chloride (PF) 0.9% PF flush 3 mL  3 mL Intracatheter Q8H Tammy Leos MD        sodium chloride (PF) 0.9% PF flush 3 mL  3 mL Intracatheter q1 min prn Tammy Leos MD              Allergies   Allergen Reactions    Keflex  [Cephalexin] Diarrhea    Lexapro [Escitalopram] Other (See Comments)     Severe anxiety       Past Medical History:   Diagnosis Date    Breast cyst     Cancer (H) 2009    multiple basal cell skin    H/O hemorrhoidectomy 01/01/2014    Hx of skin cancer, basal cell 01/01/2012    S/P hysterectomy 01/01/2009       Past Surgical History:   Procedure Laterality Date    ABDOMEN SURGERY  1993    Appendectomy    COLONOSCOPY  2013    COSMETIC SURGERY  2019,2020    repair skin cancer  "removal-nose    GYN SURGERY  2009    hysterectomy    HYSTERECTOMY, PAP NO LONGER INDICATED  2009    \"Cervix not present\"    OOPHORECTOMY      One removed, one left       Pre-sedation assessment:            /80   Temp 96.8  F (36  C) (Temporal)   Resp 16   LMP  (LMP Unknown)   SpO2 99%   General appearance: alert, appears stated age, and cooperative  Airway: No gross abnormalities appreciated that would increase risk of airway compromise.   Heart: Regular rate and rhythm  Lungs: Normal respiratory effort    ASA Classification: 2    Sedation Plan based on assessment: Moderate    Impression:  Patient deemed adequate candidate for moderate sedation         Plan:   colonoscopy      Manuel Huffman MD  7/22/2024 8:11 AM  [phone]                                "

## 2024-07-22 NOTE — ANESTHESIA PREPROCEDURE EVALUATION
"Anesthesia Pre-Procedure Evaluation    Patient: Hali Hebert   MRN: [id number] : 1960        Procedure : Procedure(s):  COLONOSCOPY          Past Medical History:   Diagnosis Date    Breast cyst     Cancer (H)     multiple basal cell skin    H/O hemorrhoidectomy 2014    Hx of skin cancer, basal cell 2012    S/P hysterectomy 2009      Past Surgical History:   Procedure Laterality Date    ABDOMEN SURGERY      Appendectomy    COLONOSCOPY      COSMETIC SURGERY  ,    repair skin cancer removal-nose    GYN SURGERY      hysterectomy    HYSTERECTOMY, PAP NO LONGER INDICATED      \"Cervix not present\"    OOPHORECTOMY      One removed, one left      Allergies   Allergen Reactions    Keflex  [Cephalexin] Diarrhea    Lexapro [Escitalopram] Other (See Comments)     Severe anxiety      Social History     Tobacco Use    Smoking status: Former     Current packs/day: 0.00     Types: Cigarettes     Start date: 1980     Quit date: 1984     Years since quittin.5    Smokeless tobacco: Never    Tobacco comments:     during college   Substance Use Topics    Alcohol use: Yes     Alcohol/week: 3.0 standard drinks of alcohol     Comment: occasional glass of wine or beer with a meal      Wt Readings from Last 1 Encounters:   24 57.4 kg (126 lb 9.6 oz)        Anesthesia Evaluation            ROS/MED HX  ENT/Pulmonary:       Neurologic:       Cardiovascular:     (+) Dyslipidemia hypertension- -   -  - -                                   (-) murmur   METS/Exercise Tolerance:     Hematologic:       Musculoskeletal:       GI/Hepatic:     (+)        bowel prep,            Renal/Genitourinary:       Endo:       Psychiatric/Substance Use:       Infectious Disease:       Malignancy:   (+) Malignancy, History of Skin.Skin CA Remission status post.      Other:            Physical Exam    Airway  airway exam normal      Mallampati: II   TM distance: > 3 FB   Neck ROM: full   Mouth " "opening: > 3 cm    Respiratory Devices and Support         Dental       (+) Completely normal teeth      Cardiovascular   cardiovascular exam normal       Rhythm and rate: regular and normal (-) no murmur    Pulmonary   pulmonary exam normal        breath sounds clear to auscultation           OUTSIDE LABS:  CBC:   Lab Results   Component Value Date    WBC 8.8 11/21/2023    WBC 3.6 (L) 06/06/2022    HGB 12.0 11/21/2023    HGB 13.7 06/06/2022    HCT 36.1 11/21/2023    HCT 42.4 06/06/2022     11/21/2023     06/06/2022     BMP:   Lab Results   Component Value Date     06/06/2022     04/15/2021    POTASSIUM 3.8 06/06/2022    POTASSIUM 4.3 04/15/2021    CHLORIDE 103 06/06/2022    CHLORIDE 104 04/15/2021    CO2 28 06/06/2022    CO2 28 04/15/2021    BUN 12 06/06/2022    BUN 15 04/15/2021    CR 0.77 06/06/2022    CR 0.79 04/15/2021    GLC 93 06/06/2022    GLC 90 04/15/2021     COAGS: No results found for: \"PTT\", \"INR\", \"FIBR\"  POC: No results found for: \"BGM\", \"HCG\", \"HCGS\"  HEPATIC:   Lab Results   Component Value Date    ALBUMIN 4.1 06/06/2022    PROTTOTAL 7.2 06/06/2022    ALT 21 06/06/2022    AST 26 06/06/2022    ALKPHOS 63 06/06/2022    BILITOTAL 0.6 06/06/2022     OTHER:   Lab Results   Component Value Date    RADHA 9.4 06/06/2022    TSH 4.42 06/06/2022       Anesthesia Plan    ASA Status:  2    NPO Status:  NPO Appropriate    Anesthesia Type: MAC.     - Reason for MAC: straight local not clinically adequate              Consents    Anesthesia Plan(s) and associated risks, benefits, and realistic alternatives discussed. Questions answered and patient/representative(s) expressed understanding.     - Discussed: Risks, Benefits and Alternatives for BOTH SEDATION and the PROCEDURE were discussed     - Discussed with:  Patient            Postoperative Care    Pain management: IV analgesics, Oral pain medications, Multi-modal analgesia.   PONV prophylaxis: Ondansetron (or other 5HT-3), Background " Propofol Infusion     Comments:    Other Comments: Risks of MAC anesthesia including but not limited to recall, awareness, and potential conversion to general anesthesia discussed.           Emmanuel Roberson MD    I have reviewed the pertinent notes and labs in the chart from the past 30 days and (re)examined the patient.  Any updates or changes from those notes are reflected in this note.

## 2024-07-22 NOTE — ANESTHESIA CARE TRANSFER NOTE
Patient: Hali Hebert    Procedure: Procedure(s):  COLONOSCOPY  COLONOSCOPY, WITH POLYPECTOMY AND BIOPSY       Diagnosis: Screen for colon cancer [Z12.11]  Diagnosis Additional Information: No value filed.    Anesthesia Type:   MAC     Note:    Oropharynx: oropharynx clear of all foreign objects  Level of Consciousness: drowsy  Oxygen Supplementation: face mask  Level of Supplemental Oxygen (L/min / FiO2): 6  Independent Airway: airway patency satisfactory and stable  Dentition: dentition unchanged  Vital Signs Stable: post-procedure vital signs reviewed and stable  Report to RN Given: handoff report given  Patient transferred to: Phase II    Handoff Report: Identifed the Patient, Identified the Reponsible Provider, Reviewed the pertinent medical history, Discussed the surgical course, Reviewed Intra-OP anesthesia mangement and issues during anesthesia, Set expectations for post-procedure period and Allowed opportunity for questions and acknowledgement of understanding      Vitals:  Vitals Value Taken Time   BP 99/56    Temp 97.8    Pulse 72 07/22/24 0901   Resp 16    SpO2 99 % 07/22/24 0901   Vitals shown include unfiled device data.    Electronically Signed By: CRISTI Welsh CRNA  July 22, 2024  9:03 AM

## 2024-08-20 ENCOUNTER — ANCILLARY PROCEDURE (OUTPATIENT)
Dept: MAMMOGRAPHY | Facility: CLINIC | Age: 64
End: 2024-08-20
Attending: FAMILY MEDICINE
Payer: COMMERCIAL

## 2024-08-20 DIAGNOSIS — Z00.00 ROUTINE GENERAL MEDICAL EXAMINATION AT A HEALTH CARE FACILITY: ICD-10-CM

## 2024-08-20 PROCEDURE — 77063 BREAST TOMOSYNTHESIS BI: CPT | Mod: TC | Performed by: RADIOLOGY

## 2024-08-20 PROCEDURE — 77067 SCR MAMMO BI INCL CAD: CPT | Mod: TC | Performed by: RADIOLOGY

## 2025-02-08 ENCOUNTER — MYC REFILL (OUTPATIENT)
Dept: FAMILY MEDICINE | Facility: CLINIC | Age: 65
End: 2025-02-08
Payer: COMMERCIAL

## 2025-02-08 DIAGNOSIS — F41.0 PANIC ATTACK: ICD-10-CM

## 2025-02-08 DIAGNOSIS — F41.1 GAD (GENERALIZED ANXIETY DISORDER): ICD-10-CM

## 2025-02-10 NOTE — TELEPHONE ENCOUNTER
Rx approved to get patient through already scheduled appointment.  Dr. Yue Malhotra MD / [facility]

## 2025-05-06 DIAGNOSIS — F41.1 GAD (GENERALIZED ANXIETY DISORDER): ICD-10-CM

## 2025-05-06 DIAGNOSIS — F41.0 PANIC ATTACK: ICD-10-CM

## 2025-06-09 ENCOUNTER — RESULTS FOLLOW-UP (OUTPATIENT)
Dept: FAMILY MEDICINE | Facility: CLINIC | Age: 65
End: 2025-06-09

## 2025-06-09 ENCOUNTER — LAB (OUTPATIENT)
Dept: LAB | Facility: CLINIC | Age: 65
End: 2025-06-09
Payer: COMMERCIAL

## 2025-06-09 DIAGNOSIS — E78.2 MIXED HYPERLIPIDEMIA: ICD-10-CM

## 2025-06-09 DIAGNOSIS — Z13.6 SCREENING FOR CARDIOVASCULAR CONDITION: ICD-10-CM

## 2025-06-09 DIAGNOSIS — Z13.1 SCREENING FOR DIABETES MELLITUS: Primary | ICD-10-CM

## 2025-06-09 LAB
CHOLEST SERPL-MCNC: 294 MG/DL
EST. AVERAGE GLUCOSE BLD GHB EST-MCNC: 111 MG/DL
FASTING STATUS PATIENT QL REPORTED: YES
FASTING STATUS PATIENT QL REPORTED: YES
GLUCOSE SERPL-MCNC: 92 MG/DL (ref 70–99)
HBA1C MFR BLD: 5.5 % (ref 0–5.6)
HDLC SERPL-MCNC: 77 MG/DL
LDLC SERPL CALC-MCNC: 197 MG/DL
NONHDLC SERPL-MCNC: 217 MG/DL
TRIGL SERPL-MCNC: 99 MG/DL

## 2025-06-09 PROCEDURE — 83036 HEMOGLOBIN GLYCOSYLATED A1C: CPT

## 2025-06-09 PROCEDURE — 80061 LIPID PANEL: CPT

## 2025-06-09 PROCEDURE — 82947 ASSAY GLUCOSE BLOOD QUANT: CPT

## 2025-06-09 PROCEDURE — 36415 COLL VENOUS BLD VENIPUNCTURE: CPT

## 2025-06-09 SDOH — HEALTH STABILITY: PHYSICAL HEALTH: ON AVERAGE, HOW MANY DAYS PER WEEK DO YOU ENGAGE IN MODERATE TO STRENUOUS EXERCISE (LIKE A BRISK WALK)?: 7 DAYS

## 2025-06-09 SDOH — HEALTH STABILITY: PHYSICAL HEALTH: ON AVERAGE, HOW MANY MINUTES DO YOU ENGAGE IN EXERCISE AT THIS LEVEL?: 30 MIN

## 2025-06-09 ASSESSMENT — SOCIAL DETERMINANTS OF HEALTH (SDOH): HOW OFTEN DO YOU GET TOGETHER WITH FRIENDS OR RELATIVES?: MORE THAN THREE TIMES A WEEK

## 2025-06-09 NOTE — RESULT ENCOUNTER NOTE
Nando Lal,  This note is to let you know that your results were normal.  Please let my team know if you have any further questions or concerns.  Sincerely,  Dr. Yue Malhotra MD

## 2025-06-10 NOTE — RESULT ENCOUNTER NOTE
Nando Gibson - you are seeing Hali in a few days so wanted to send you my note below.   I think she should start a statin.  I have a memory that she didn't really want to last year and that's why we did CT Ca scan...Let me know if any questions!

## 2025-06-12 NOTE — TELEPHONE ENCOUNTER
Dr. Malhotra/Paige - see MyChart FYI on patient preference to work on dietary changes vs starting a statin.    GOKUL Merlos, BSN, PHN, AMB-BC (she/her)  [facility] RN

## 2025-06-12 NOTE — TELEPHONE ENCOUNTER
TC team - please call for scheduling as noted by PCP.    Additional information was sent to pt via [employer].    GOKUL Merlos BSN, PHN, AMB-BC (she/her)  [facility] RN

## 2025-06-12 NOTE — TELEPHONE ENCOUNTER
"RN or TC team:   Can you offer to switch her appointment with Paige to my schedule next week 6/18 at 1:50?  I held the spot (as I had an opening).  If she declines you can reopen that spot.  Can you copy below, and let her know that this is why I recommend it and the guidelines I'm following?  Also that I'm happy to refer her to a preventative cardiologist and/or talk more with her about this at appointment next week?  Thank you!        \"According to an invited review in The [place] Journal of Medicine, a patient with an LDL cholesterol level >=190 mg/dL should be treated with a high-intensity statin for primary prevention, regardless of their coronary artery calcium (CAC) score. The rationale, as discussed in the review, is that severe hypercholesterolemia (LDL >=190 mg/dL) is associated with a substantially increased lifetime risk of atherosclerotic cardiovascular disease, often reflecting underlying familial hypercholesterolemia, and thus warrants statin therapy without the need to calculate 10-year risk or to use CAC scoring to guide the decision.[1]    The review further notes that while a CAC score of 0 can be used to defer statin therapy in many intermediate-risk patients, this does not apply to those with LDL >=190 mg/dL, as the risk conferred by such high LDL levels supersedes the risk stratification provided by CAC scoring.[1] The American College of Cardiology and American Heart Association guidelines, as cited in the review, recommend initiating a high-intensity statin (or the maximum tolerated dose) in this population.[1] If LDL remains >=100 mg/dL despite statin therapy, additional agents such as ezetimibe or a PCSK9 inhibitor may be considered.[1]\"      Lipid Management for the Prevention of Atherosclerotic Cardiovascular Disease.  Maddie ED, Slade JW, Tamika RS.  The [place] Journal of Medicine. 2019;381(16):9417-0499. doi:10.1056/SXCAdv1606597.    2019 ACC/AHA Guideline on the Primary " Prevention of Cardiovascular Disease: A Report of the American College of Cardiology/American Heart Association Task Force on Clinical Practice Guidelines.  Leena DK, Tamika RS, Avinash MA, et al.  Journal of the American College of Cardiology. 2019;74(10):q674-p618. doi:10.1016/j.jacc.2019.03.010.      2022 ACC Expert Consensus Decision Pathway on the Role of Nonstatin Therapies for LDL-Cholesterol Lowering in the Management of Atherosclerotic Cardiovascular Disease Risk: A Report of the American College of Cardiology Solution Set Oversight Committee.  Jasmyne DM, Satya PB, Raciel CM, et al.  Journal of the American College of Cardiology. 2022;80(14):6380-6731. doi:10.1016/j.jacc.2022.07.006.      2018 AHA/ACC/AACVPR/AAPA/ABC/ACPM/ADA/AGS/APhA/ASPC/NLA/PCNA Guideline on the Management of Blood Cholesterol: A Report of the American College of Cardiology/American Heart Association Task Force on Clinical Practice Guidelines.  Anthony SM, Fermín NJ, Idania AL, et al.  Journal of the American College of Cardiology. 2019;73(24):s345-p597. doi:10.1016/j.jacc.2018.11.003.

## 2025-06-13 SDOH — HEALTH STABILITY: PHYSICAL HEALTH: ON AVERAGE, HOW MANY MINUTES DO YOU ENGAGE IN EXERCISE AT THIS LEVEL?: 30 MIN

## 2025-06-13 SDOH — HEALTH STABILITY: PHYSICAL HEALTH: ON AVERAGE, HOW MANY DAYS PER WEEK DO YOU ENGAGE IN MODERATE TO STRENUOUS EXERCISE (LIKE A BRISK WALK)?: 7 DAYS

## 2025-06-13 ASSESSMENT — SOCIAL DETERMINANTS OF HEALTH (SDOH): HOW OFTEN DO YOU GET TOGETHER WITH FRIENDS OR RELATIVES?: MORE THAN THREE TIMES A WEEK

## 2025-06-18 ENCOUNTER — OFFICE VISIT (OUTPATIENT)
Dept: FAMILY MEDICINE | Facility: CLINIC | Age: 65
End: 2025-06-18
Payer: COMMERCIAL

## 2025-06-18 VITALS
WEIGHT: 126 LBS | HEIGHT: 62 IN | BODY MASS INDEX: 23.19 KG/M2 | TEMPERATURE: 97.2 F | DIASTOLIC BLOOD PRESSURE: 79 MMHG | HEART RATE: 99 BPM | OXYGEN SATURATION: 100 % | RESPIRATION RATE: 16 BRPM | SYSTOLIC BLOOD PRESSURE: 126 MMHG

## 2025-06-18 DIAGNOSIS — M85.80 OSTEOPENIA, UNSPECIFIED LOCATION: ICD-10-CM

## 2025-06-18 DIAGNOSIS — F41.0 PANIC ATTACK: ICD-10-CM

## 2025-06-18 DIAGNOSIS — Z00.00 ROUTINE GENERAL MEDICAL EXAMINATION AT A HEALTH CARE FACILITY: Primary | ICD-10-CM

## 2025-06-18 DIAGNOSIS — E78.2 MIXED HYPERLIPIDEMIA: ICD-10-CM

## 2025-06-18 DIAGNOSIS — F41.1 GAD (GENERALIZED ANXIETY DISORDER): ICD-10-CM

## 2025-06-18 DIAGNOSIS — Z85.828 HISTORY OF BASAL CELL CARCINOMA: ICD-10-CM

## 2025-06-18 PROCEDURE — 99396 PREV VISIT EST AGE 40-64: CPT | Performed by: FAMILY MEDICINE

## 2025-06-18 PROCEDURE — 96127 BRIEF EMOTIONAL/BEHAV ASSMT: CPT | Performed by: FAMILY MEDICINE

## 2025-06-18 PROCEDURE — 99214 OFFICE O/P EST MOD 30 MIN: CPT | Mod: 25 | Performed by: FAMILY MEDICINE

## 2025-06-18 PROCEDURE — 3074F SYST BP LT 130 MM HG: CPT | Performed by: FAMILY MEDICINE

## 2025-06-18 PROCEDURE — 3078F DIAST BP <80 MM HG: CPT | Performed by: FAMILY MEDICINE

## 2025-06-18 RX ORDER — ROSUVASTATIN CALCIUM 5 MG/1
5 TABLET, COATED ORAL DAILY
Qty: 90 TABLET | Refills: 3 | Status: SHIPPED | OUTPATIENT
Start: 2025-06-18

## 2025-06-18 RX ORDER — LORAZEPAM 0.5 MG/1
0.5 TABLET ORAL DAILY PRN
Qty: 10 TABLET | Refills: 0 | Status: SHIPPED | OUTPATIENT
Start: 2025-06-18

## 2025-06-18 ASSESSMENT — ANXIETY QUESTIONNAIRES
IF YOU CHECKED OFF ANY PROBLEMS ON THIS QUESTIONNAIRE, HOW DIFFICULT HAVE THESE PROBLEMS MADE IT FOR YOU TO DO YOUR WORK, TAKE CARE OF THINGS AT HOME, OR GET ALONG WITH OTHER PEOPLE: NOT DIFFICULT AT ALL
1. FEELING NERVOUS, ANXIOUS, OR ON EDGE: SEVERAL DAYS
GAD7 TOTAL SCORE: 1
7. FEELING AFRAID AS IF SOMETHING AWFUL MIGHT HAPPEN: NOT AT ALL
8. IF YOU CHECKED OFF ANY PROBLEMS, HOW DIFFICULT HAVE THESE MADE IT FOR YOU TO DO YOUR WORK, TAKE CARE OF THINGS AT HOME, OR GET ALONG WITH OTHER PEOPLE?: NOT DIFFICULT AT ALL
GAD7 TOTAL SCORE: 1
GAD7 TOTAL SCORE: 1
3. WORRYING TOO MUCH ABOUT DIFFERENT THINGS: NOT AT ALL
5. BEING SO RESTLESS THAT IT IS HARD TO SIT STILL: NOT AT ALL
7. FEELING AFRAID AS IF SOMETHING AWFUL MIGHT HAPPEN: NOT AT ALL
6. BECOMING EASILY ANNOYED OR IRRITABLE: NOT AT ALL
2. NOT BEING ABLE TO STOP OR CONTROL WORRYING: NOT AT ALL
4. TROUBLE RELAXING: NOT AT ALL

## 2025-06-18 NOTE — PROGRESS NOTES
Preventive Care Visit  [facility]  Yue Malhotra MD, Family Medicine  Jun 18, 2025      Assessment & Plan     Problem List as of 6/18/2025 Reviewed: 6/18/2025  3:01 PM by Yue Malhotra MD            Noted       High    1. Routine general medical examination at a health care facility - Primary 6/7/2024     Overview Addendum 6/18/2025  3:03 PM by Yue Malhotra MD   64 year old who I met in 2022.   (Sebastian) with 4 adult daughters (2 here, 1 in [place], 1 in hospitals) and 5 grandkids, and works for [employer] as .    PLAN:  Mammo: up to date. Yearly (family history of breast cancer) due in August 2025  Pap: no longer indicated, hysterectomy with cervix removed  Colon: up to date, due 2034  Immunizations: up to date   Dexa: due, osteopenia noted 2021.  Runner.  Labs: problem based, reviewed pre-visit labs, see below  Discussed healthy lifestyle and aging recommendations including regular exercise, adequate and regular sleep, 5+ fruits and veggies daily.            Medium    2. ERIK (generalized anxiety disorder) 6/2/2022     Overview Addendum 6/18/2025  3:01 PM by Yue Malhotra MD   06/18/2025 A/P:  Stable on sertraline, working well.  50 mg.  May consider dropping carefully to 25 mg; stay on that lower dose for at least 2 months before dropping further.  Refilled.  Rare lorazepam #10 /yr refilled.  Checked , no concerns.    2022:Lexapro did not work well  2023: Sertraline effective although difficulty with initiation.  Rare lorazepam for panic #10 in 1 year          Relevant Medications     LORazepam (ATIVAN) 0.5 MG tablet    3. History of basal cell carcinoma 6/7/2024     Overview Addendum 6/18/2025  2:57 PM by Yue Malhotra MD   06/18/2025 A/P:  Stable, follows with Dr. Fernandez.  Sees every 6 months.         4. Mixed hyperlipidemia 7/18/2019     Overview Addendum 6/18/2025  3:02 PM by Yue Malhotra MD   06/18/2025 A/P:  Likely famililal  as has always been high LDL and HDL despite diet and exercise  CT Agatston score = 0 7/2024  Long discussion today - LDL most recently was 197, but no family history of CAD or stroke.  Still, with LDL >190 I recommend low dose rosuvastatin even every other day and sent to pharmacy.    She will also see preventative cardiology (Dr. Andres Pagan) to discuss - referred.    Hx: 2023: ASCVD score 3.4% Discussed calcium coronary CT - she's considering          Relevant Medications     rosuvastatin (CRESTOR) 5 MG tablet     Other Relevant Orders     Adult Cardiology Eval  Referral    5. Osteopenia, unspecified location 6/18/2025     Overview Signed 6/18/2025  3:04 PM by Yue Malhotra MD   06/18/2025 A/P:  Noted 2021  Recheck due, ordered  Runner.  Discussed calcium, D, and jumping, weights.          Relevant Orders     DX Bone Density         Counseling  Appropriate preventive services were addressed with this patient via screening, questionnaire, or discussion as appropriate for fall prevention, nutrition, physical activity, Tobacco-use cessation, social engagement, weight loss and cognition.  Checklist reviewing preventive services available has been given to the patient.  Reviewed patient's diet, addressing concerns and/or questions.         Deyvi Lal is a 64 year old, presenting for the following:  Physical        6/18/2025     2:06 PM   Additional Questions   Roomed by Paz CALERO  Hali Hebert, age: 64 years    High cholesterol  - Concerned about high cholesterol levels based on recent test results  - History of high cholesterol for eight years  - Parents also have high cholesterol but no heart condition; both are in perfect health at ages 87 and 88  - Parents started cholesterol medication a year or two ago  - Previous doctor recommended statin in her 30s based on overall cholesterol number  - After last year's test results, consumed hamburgers and butter, foods avoided for 20  years  - Normally disciplined with diet, believes she can bring cholesterol levels down through diet    Anxiety management  - Currently takes Zoloft for anxiety  - Reports feeling great anxiety-wise before this week  - Considering reducing Zoloft dosage from one per day to half per day  - Uses lorazepam primarily for airplane travel, takes half a pill when flying    Skin cancer monitoring  - History of basal cell carcinoma, sees dermatologist every six months for proactive monitoring    Social history  - , celebrating 40th anniversary soon  - Has four daughters; two live nearby, one in [place], and one in [place]  - Five grandchildren      Advance Care Planning  Patient states has Health Care Directive and will send to [employer].        6/13/2025   General Health   How would you rate your overall physical health? Excellent   Feel stress (tense, anxious, or unable to sleep) Only a little   (!) STRESS CONCERN      6/13/2025   Nutrition   Three or more servings of calcium each day? Yes   Diet: Regular (no restrictions)   How many servings of fruit and vegetables per day? (!) 2-3   How many sweetened beverages each day? 0-1         6/13/2025   Exercise   Days per week of moderate/strenous exercise 7 days   Average minutes spent exercising at this level 30 min         6/13/2025   Social Factors   Frequency of gathering with friends or relatives More than three times a week   Worry food won't last until get money to buy more No   Food not last or not have enough money for food? No   Do you have housing? (Housing is defined as stable permanent housing and does not include staying outside in a car, in a tent, in an abandoned building, in an overnight shelter, or couch-surfing.) Yes   Are you worried about losing your housing? No   Lack of transportation? No   Unable to get utilities (heat,electricity)? No         6/13/2025   Fall Risk   Fallen 2 or more times in the past year? No   Trouble with walking or  balance? No          2025   Dental   Dentist two times every year? Yes         Today's PHQ-2 Score:       2025     1:56 PM   PHQ-2 (  [employer])   Q1: Little interest or pleasure in doing things 0    Q2: Feeling down, depressed or hopeless 0    PHQ-2 Score 0    Q1: Little interest or pleasure in doing things Not at all   Q2: Feeling down, depressed or hopeless Not at all   PHQ-2 Score 0       Proxy-reported           2025   Substance Use   Alcohol more than 3/day or more than 7/wk No   Do you use any other substances recreationally? No     Social History     Tobacco Use    Smoking status: Former     Current packs/day: 0.00     Types: Cigarettes     Start date: 1980     Quit date: 1984     Years since quittin.4    Smokeless tobacco: Never    Tobacco comments:     during college   Vaping Use    Vaping status: Never Used   Substance Use Topics    Alcohol use: Yes     Alcohol/week: 3.0 standard drinks of alcohol     Comment: occasional glass of wine or beer with a meal    Drug use: Never           2024   LAST FHS-7 RESULTS   1st degree relative breast or ovarian cancer Yes   Any relative bilateral breast cancer No   Any male have breast cancer No   Any ONE woman have BOTH breast AND ovarian cancer No   Any woman with breast cancer before 50yrs No   2 or more relatives with breast AND/OR ovarian cancer No   2 or more relatives with breast AND/OR bowel cancer No                2025   STI Screening   New sexual partner(s) since last STI/HIV test? No     History of abnormal Pap smear: Status post hysterectomy with removal of cervix and no history of CIN2 or greater or cervical cancer. Health Maintenance and Surgical History updated.       ASCVD Risk   The 10-year ASCVD risk score (Leena MENDIETA, et al., 2019) is: 4.2%    Values used to calculate the score:      Age: 64 years      Sex: Female      Is Non- : No      Diabetic: No      Tobacco smoker: No       "Systolic Blood Pressure: 126 mmHg      Is BP treated: No      HDL Cholesterol: 77 mg/dL      Total Cholesterol: 294 mg/dL           Reviewed and updated as needed this visit by Provider    Allergies  Meds  Problems    Fam Hx                 Objective    Exam  /79 (BP Location: Right arm, Patient Position: Chair, Cuff Size: Adult Regular)   Pulse 99   Temp 97.2  F (36.2  C) (Tympanic)   Resp 16   Ht 1.575 m (5' 2\")   Wt 57.2 kg (126 lb)   LMP  (LMP Unknown)   SpO2 100%   BMI 23.05 kg/m     Estimated body mass index is 23.05 kg/m  as calculated from the following:    Height as of this encounter: 1.575 m (5' 2\").    Weight as of this encounter: 57.2 kg (126 lb).    Physical Exam  GENERAL: alert and no distress  EYES: Eyes grossly normal to inspection, PERRL and conjunctivae and sclerae normal  HENT: ear canals and TM's normal, nose and mouth without ulcers or lesions  NECK: no adenopathy, no asymmetry, masses, or scars  RESP: lungs clear to auscultation - no rales, rhonchi or wheezes  CV: regular rate and rhythm, normal S1 S2, no S3 or S4, no murmur, click or rub, no peripheral edema  ABDOMEN: soft, nontender, no hepatosplenomegaly, no masses and bowel sounds normal  MS: no gross musculoskeletal defects noted, no edema  SKIN: no suspicious lesions or rashes  NEURO: Normal strength and tone, mentation intact and speech normal  PSYCH: mentation appears normal, affect normal/bright        Signed Electronically by: Yue Malhotra MD    Answers submitted by the patient for this visit:  Patient Health Questionnaire (G7) (Submitted on 6/18/2025)  ERIK 7 TOTAL SCORE: 1    "

## 2025-06-18 NOTE — PATIENT INSTRUCTIONS
1. Ensure adequate dietary calcium (3-4 servings a day or 1200 mg) and vitamin D (800 IU daily) intake.  If you do not get enough in your diet, take a supplement to get to these goals.  2. Lift heavy weights.  Ideally ~20 minutes of weight bearing exercise 3-4 times a week or more.  3. Jump!  Or play racquet sports such as tennis or pickleball, or do some running. A little jumping (even 10-20 per day) goes a long way at helping build bone strength.          Patient Education   Preventive Care Advice   This is general advice given by our system to help you stay healthy. However, your care team may have specific advice just for you. Please talk to your care team about your preventive care needs.  Nutrition  Eat 5 or more servings of fruits and vegetables each day.  Try wheat bread, brown rice and whole grain pasta (instead of white bread, rice, and pasta).  Get enough calcium and vitamin D. Check the label on foods and aim for 100% of the RDA (recommended daily allowance).  Lifestyle  Exercise at least 150 minutes each week  (30 minutes a day, 5 days a week).  Do muscle strengthening activities 2 days a week. These help control your weight and prevent disease.  No smoking.  Wear sunscreen to prevent skin cancer.  Have a dental exam and cleaning every 6 months.  Yearly exams  See your health care team every year to talk about:  Any changes in your health.  Any medicines your care team has prescribed.  Preventive care, family planning, and ways to prevent chronic diseases.  Shots (vaccines)   HPV shots (up to age 26), if you've never had them before.  Hepatitis B shots (up to age 59), if you've never had them before.  COVID-19 shot: Get this shot when it's due.  Flu shot: Get a flu shot every year.  Tetanus shot: Get a tetanus shot every 10 years.  Pneumococcal, hepatitis A, and RSV shots: Ask your care team if you need these based on your risk.  Shingles shot (for age 50 and up)  General health tests  Diabetes  screening:  Starting at age 35, Get screened for diabetes at least every 3 years.  If you are younger than age 35, ask your care team if you should be screened for diabetes.  Cholesterol test: At age 39, start having a cholesterol test every 5 years, or more often if advised.  Bone density scan (DEXA): At age 50, ask your care team if you should have this scan for osteoporosis (brittle bones).  Hepatitis C: Get tested at least once in your life.  STIs (sexually transmitted infections)  Before age 24: Ask your care team if you should be screened for STIs.  After age 24: Get screened for STIs if you're at risk. You are at risk for STIs (including HIV) if:  You are sexually active with more than one person.  You don't use condoms every time.  You or a partner was diagnosed with a sexually transmitted infection.  If you are at risk for HIV, ask about PrEP medicine to prevent HIV.  Get tested for HIV at least once in your life, whether you are at risk for HIV or not.  Cancer screening tests  Cervical cancer screening: If you have a cervix, begin getting regular cervical cancer screening tests starting at age 21.  Breast cancer scan (mammogram): If you've ever had breasts, begin having regular mammograms starting at age 40. This is a scan to check for breast cancer.  Colon cancer screening: It is important to start screening for colon cancer at age 45.  Have a colonoscopy test every 10 years (or more often if you're at risk) Or, ask your provider about stool tests like a FIT test every year or Cologuard test every 3 years.  To learn more about your testing options, visit:   .  For help making a decision, visit:   https://bit.ly/ih43836.  Prostate cancer screening test: If you have a prostate, ask your care team if a prostate cancer screening test (PSA) at age 55 is right for you.  Lung cancer screening: If you are a current or former smoker ages 50 to 80, ask your care team if ongoing lung cancer screenings are right for  you.  For informational purposes only. Not to replace the advice of your health care provider. Copyright   2023 [facility]. All rights reserved. Clinically reviewed by the [facility] Transitions Program. [employer] [id number] - REV 01/24.

## 2025-06-19 ENCOUNTER — PATIENT OUTREACH (OUTPATIENT)
Dept: CARE COORDINATION | Facility: CLINIC | Age: 65
End: 2025-06-19
Payer: COMMERCIAL

## 2025-06-23 ENCOUNTER — PATIENT OUTREACH (OUTPATIENT)
Dept: CARE COORDINATION | Facility: CLINIC | Age: 65
End: 2025-06-23
Payer: COMMERCIAL

## 2025-06-23 ENCOUNTER — MYC MEDICAL ADVICE (OUTPATIENT)
Dept: FAMILY MEDICINE | Facility: CLINIC | Age: 65
End: 2025-06-23
Payer: COMMERCIAL

## 2025-06-23 DIAGNOSIS — E78.2 MIXED HYPERLIPIDEMIA: Primary | ICD-10-CM

## 2025-07-17 ENCOUNTER — DOCUMENTATION ONLY (OUTPATIENT)
Dept: OTHER | Facility: CLINIC | Age: 65
End: 2025-07-17
Payer: COMMERCIAL

## 2025-08-03 DIAGNOSIS — F41.0 PANIC ATTACK: ICD-10-CM

## 2025-08-03 DIAGNOSIS — F41.1 GAD (GENERALIZED ANXIETY DISORDER): ICD-10-CM

## 2025-08-25 ENCOUNTER — ANCILLARY PROCEDURE (OUTPATIENT)
Dept: BONE DENSITY | Facility: CLINIC | Age: 65
End: 2025-08-25
Attending: FAMILY MEDICINE
Payer: COMMERCIAL

## 2025-08-25 ENCOUNTER — ANCILLARY PROCEDURE (OUTPATIENT)
Dept: MAMMOGRAPHY | Facility: CLINIC | Age: 65
End: 2025-08-25
Attending: FAMILY MEDICINE
Payer: COMMERCIAL

## 2025-08-25 DIAGNOSIS — Z12.31 VISIT FOR SCREENING MAMMOGRAM: ICD-10-CM

## 2025-08-25 DIAGNOSIS — M85.80 OSTEOPENIA, UNSPECIFIED LOCATION: ICD-10-CM

## 2025-08-25 PROCEDURE — 77091 TBS TECHL CALCULATION ONLY: CPT | Performed by: RADIOLOGY

## 2025-08-25 PROCEDURE — 77067 SCR MAMMO BI INCL CAD: CPT | Mod: TC | Performed by: RADIOLOGY

## 2025-08-25 PROCEDURE — 77080 DXA BONE DENSITY AXIAL: CPT | Mod: TC | Performed by: RADIOLOGY

## 2025-08-25 PROCEDURE — 77063 BREAST TOMOSYNTHESIS BI: CPT | Mod: TC | Performed by: RADIOLOGY
